# Patient Record
Sex: MALE | Race: WHITE | NOT HISPANIC OR LATINO | Employment: FULL TIME | RURAL
[De-identification: names, ages, dates, MRNs, and addresses within clinical notes are randomized per-mention and may not be internally consistent; named-entity substitution may affect disease eponyms.]

---
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
## 2022-05-20 ENCOUNTER — APPOINTMENT (OUTPATIENT)
Dept: CT IMAGING | Facility: HOSPITAL | Age: 22
End: 2022-05-20

## 2022-05-20 ENCOUNTER — HOSPITAL ENCOUNTER (EMERGENCY)
Facility: HOSPITAL | Age: 22
Discharge: HOME | End: 2022-05-20
Attending: EMERGENCY MEDICINE | Admitting: EMERGENCY MEDICINE

## 2022-05-20 VITALS
SYSTOLIC BLOOD PRESSURE: 111 MMHG | OXYGEN SATURATION: 100 % | TEMPERATURE: 100.1 F | HEART RATE: 104 BPM | HEIGHT: 69 IN | RESPIRATION RATE: 20 BRPM | DIASTOLIC BLOOD PRESSURE: 66 MMHG | BODY MASS INDEX: 23.13 KG/M2 | WEIGHT: 156.2 LBS

## 2022-05-20 DIAGNOSIS — N39.0 URINARY TRACT INFECTION WITHOUT HEMATURIA, SITE UNSPECIFIED: Primary | ICD-10-CM

## 2022-05-20 DIAGNOSIS — R50.9 ACUTE FEBRILE ILLNESS: ICD-10-CM

## 2022-05-20 LAB
ALBUMIN SERPL-MCNC: 4.6 G/DL (ref 3.5–5.2)
ALBUMIN/GLOB SERPL: 1.5 G/DL
ALP SERPL-CCNC: 74 U/L (ref 39–117)
ALT SERPL W P-5'-P-CCNC: 13 U/L (ref 1–41)
ANION GAP SERPL CALCULATED.3IONS-SCNC: 12.9 MMOL/L (ref 5–15)
AST SERPL-CCNC: 15 U/L (ref 1–40)
B PARAPERT DNA SPEC QL NAA+PROBE: NOT DETECTED
B PERT DNA SPEC QL NAA+PROBE: NOT DETECTED
BACTERIA UR QL AUTO: ABNORMAL /HPF
BASOPHILS # BLD AUTO: 0.04 10*3/MM3 (ref 0–0.2)
BASOPHILS NFR BLD AUTO: 0.2 % (ref 0–1.5)
BILIRUB SERPL-MCNC: 0.9 MG/DL (ref 0–1.2)
BILIRUB UR QL STRIP: NEGATIVE
BUN SERPL-MCNC: 14 MG/DL (ref 6–20)
BUN/CREAT SERPL: 13.7 (ref 7–25)
C PNEUM DNA NPH QL NAA+NON-PROBE: NOT DETECTED
CALCIUM SPEC-SCNC: 9.5 MG/DL (ref 8.6–10.5)
CHLORIDE SERPL-SCNC: 101 MMOL/L (ref 98–107)
CLARITY UR: ABNORMAL
CO2 SERPL-SCNC: 22.1 MMOL/L (ref 22–29)
COLOR UR: ABNORMAL
CREAT SERPL-MCNC: 1.02 MG/DL (ref 0.76–1.27)
D-LACTATE SERPL-SCNC: 1.4 MMOL/L (ref 0.5–2)
DEPRECATED RDW RBC AUTO: 38.6 FL (ref 37–54)
EGFRCR SERPLBLD CKD-EPI 2021: 108.6 ML/MIN/1.73
EOSINOPHIL # BLD AUTO: 0.03 10*3/MM3 (ref 0–0.4)
EOSINOPHIL NFR BLD AUTO: 0.2 % (ref 0.3–6.2)
ERYTHROCYTE [DISTWIDTH] IN BLOOD BY AUTOMATED COUNT: 12.1 % (ref 12.3–15.4)
FLUAV SUBTYP SPEC NAA+PROBE: NOT DETECTED
FLUBV RNA ISLT QL NAA+PROBE: NOT DETECTED
GLOBULIN UR ELPH-MCNC: 3 GM/DL
GLUCOSE SERPL-MCNC: 138 MG/DL (ref 65–99)
GLUCOSE UR STRIP-MCNC: NEGATIVE MG/DL
HADV DNA SPEC NAA+PROBE: NOT DETECTED
HCOV 229E RNA SPEC QL NAA+PROBE: NOT DETECTED
HCOV HKU1 RNA SPEC QL NAA+PROBE: NOT DETECTED
HCOV NL63 RNA SPEC QL NAA+PROBE: NOT DETECTED
HCOV OC43 RNA SPEC QL NAA+PROBE: NOT DETECTED
HCT VFR BLD AUTO: 41.2 % (ref 37.5–51)
HGB BLD-MCNC: 14.5 G/DL (ref 13–17.7)
HGB UR QL STRIP.AUTO: ABNORMAL
HMPV RNA NPH QL NAA+NON-PROBE: NOT DETECTED
HPIV1 RNA ISLT QL NAA+PROBE: NOT DETECTED
HPIV2 RNA SPEC QL NAA+PROBE: NOT DETECTED
HPIV3 RNA NPH QL NAA+PROBE: NOT DETECTED
HPIV4 P GENE NPH QL NAA+PROBE: NOT DETECTED
HYALINE CASTS UR QL AUTO: ABNORMAL /LPF
IMM GRANULOCYTES # BLD AUTO: 0.09 10*3/MM3 (ref 0–0.05)
IMM GRANULOCYTES NFR BLD AUTO: 0.5 % (ref 0–0.5)
KETONES UR QL STRIP: ABNORMAL
LEUKOCYTE ESTERASE UR QL STRIP.AUTO: ABNORMAL
LYMPHOCYTES # BLD AUTO: 2.15 10*3/MM3 (ref 0.7–3.1)
LYMPHOCYTES NFR BLD AUTO: 11.7 % (ref 19.6–45.3)
M PNEUMO IGG SER IA-ACNC: NOT DETECTED
MCH RBC QN AUTO: 30.9 PG (ref 26.6–33)
MCHC RBC AUTO-ENTMCNC: 35.2 G/DL (ref 31.5–35.7)
MCV RBC AUTO: 87.7 FL (ref 79–97)
MONOCYTES # BLD AUTO: 0.89 10*3/MM3 (ref 0.1–0.9)
MONOCYTES NFR BLD AUTO: 4.8 % (ref 5–12)
NEUTROPHILS NFR BLD AUTO: 15.25 10*3/MM3 (ref 1.7–7)
NEUTROPHILS NFR BLD AUTO: 82.6 % (ref 42.7–76)
NITRITE UR QL STRIP: NEGATIVE
NRBC BLD AUTO-RTO: 0 /100 WBC (ref 0–0.2)
PH UR STRIP.AUTO: 6 [PH] (ref 5–8)
PLATELET # BLD AUTO: 214 10*3/MM3 (ref 140–450)
PMV BLD AUTO: 9.5 FL (ref 6–12)
POTASSIUM SERPL-SCNC: 3.5 MMOL/L (ref 3.5–5.2)
PROCALCITONIN SERPL-MCNC: 0.19 NG/ML (ref 0–0.25)
PROT SERPL-MCNC: 7.6 G/DL (ref 6–8.5)
PROT UR QL STRIP: ABNORMAL
RBC # BLD AUTO: 4.7 10*6/MM3 (ref 4.14–5.8)
RBC # UR STRIP: ABNORMAL /HPF
REF LAB TEST METHOD: ABNORMAL
RHINOVIRUS RNA SPEC NAA+PROBE: NOT DETECTED
RSV RNA NPH QL NAA+NON-PROBE: NOT DETECTED
SARS-COV-2 RNA NPH QL NAA+NON-PROBE: NOT DETECTED
SODIUM SERPL-SCNC: 136 MMOL/L (ref 136–145)
SP GR UR STRIP: 1.03 (ref 1–1.03)
SQUAMOUS #/AREA URNS HPF: ABNORMAL /HPF
UROBILINOGEN UR QL STRIP: ABNORMAL
WBC # UR STRIP: ABNORMAL /HPF
WBC NRBC COR # BLD: 18.45 10*3/MM3 (ref 3.4–10.8)

## 2022-05-20 PROCEDURE — 87186 SC STD MICRODIL/AGAR DIL: CPT | Performed by: EMERGENCY MEDICINE

## 2022-05-20 PROCEDURE — 0202U NFCT DS 22 TRGT SARS-COV-2: CPT | Performed by: EMERGENCY MEDICINE

## 2022-05-20 PROCEDURE — 81001 URINALYSIS AUTO W/SCOPE: CPT | Performed by: EMERGENCY MEDICINE

## 2022-05-20 PROCEDURE — 84145 PROCALCITONIN (PCT): CPT | Performed by: EMERGENCY MEDICINE

## 2022-05-20 PROCEDURE — 80053 COMPREHEN METABOLIC PANEL: CPT | Performed by: EMERGENCY MEDICINE

## 2022-05-20 PROCEDURE — 74176 CT ABD & PELVIS W/O CONTRAST: CPT

## 2022-05-20 PROCEDURE — 85025 COMPLETE CBC W/AUTO DIFF WBC: CPT | Performed by: EMERGENCY MEDICINE

## 2022-05-20 PROCEDURE — 25010000002 CEFTRIAXONE SODIUM-DEXTROSE 1-3.74 GM-%(50ML) RECONSTITUTED SOLUTION: Performed by: EMERGENCY MEDICINE

## 2022-05-20 PROCEDURE — 83605 ASSAY OF LACTIC ACID: CPT | Performed by: EMERGENCY MEDICINE

## 2022-05-20 PROCEDURE — 87086 URINE CULTURE/COLONY COUNT: CPT | Performed by: EMERGENCY MEDICINE

## 2022-05-20 PROCEDURE — 96365 THER/PROPH/DIAG IV INF INIT: CPT

## 2022-05-20 PROCEDURE — 87077 CULTURE AEROBIC IDENTIFY: CPT | Performed by: EMERGENCY MEDICINE

## 2022-05-20 PROCEDURE — 99283 EMERGENCY DEPT VISIT LOW MDM: CPT

## 2022-05-20 RX ORDER — ACETAMINOPHEN 325 MG/1
975 TABLET ORAL ONCE
Status: COMPLETED | OUTPATIENT
Start: 2022-05-20 | End: 2022-05-20

## 2022-05-20 RX ORDER — CEFTRIAXONE 1 G/50ML
1 INJECTION, SOLUTION INTRAVENOUS ONCE
Status: COMPLETED | OUTPATIENT
Start: 2022-05-20 | End: 2022-05-20

## 2022-05-20 RX ORDER — IBUPROFEN 800 MG/1
800 TABLET ORAL ONCE
Status: COMPLETED | OUTPATIENT
Start: 2022-05-20 | End: 2022-05-20

## 2022-05-20 RX ORDER — CEFUROXIME AXETIL 500 MG/1
500 TABLET ORAL 2 TIMES DAILY
Qty: 20 TABLET | Refills: 0 | Status: SHIPPED | OUTPATIENT
Start: 2022-05-20 | End: 2022-12-15

## 2022-05-20 RX ADMIN — ACETAMINOPHEN 975 MG: 325 TABLET ORAL at 20:25

## 2022-05-20 RX ADMIN — CEFTRIAXONE 1 G: 1 INJECTION, SOLUTION INTRAVENOUS at 21:28

## 2022-05-20 RX ADMIN — SODIUM CHLORIDE 1000 ML: 9 INJECTION, SOLUTION INTRAVENOUS at 21:29

## 2022-05-20 RX ADMIN — IBUPROFEN 800 MG: 800 TABLET ORAL at 20:25

## 2022-05-21 NOTE — ED PROVIDER NOTES
TRIAGE CHIEF COMPLAINT:     Nursing and triage notes reviewed    Chief Complaint   Patient presents with   • Chills   • Fever   • Fatigue      HPI: Yoandy Merida is a 21 y.o. male who presents to the emergency department complaining of a 1 day history of fever, chills, fatigue, body aches, general malaise.  Patient denies having sore throat, nasal congestion, cough.  He states he has had some dysuria.  Denies abdominal pain, nausea, vomiting.  Denies recent travel or sick contacts.    REVIEW OF SYSTEMS: All other systems reviewed and are negative     PAST MEDICAL HISTORY:   History reviewed. No pertinent past medical history.     FAMILY HISTORY:   History reviewed. No pertinent family history.     SOCIAL HISTORY:   Social History     Socioeconomic History   • Marital status: Single        SURGICAL HISTORY:   History reviewed. No pertinent surgical history.     CURRENT MEDICATIONS:      Medication List      ASK your doctor about these medications    albuterol sulfate  (90 Base) MCG/ACT inhaler  Commonly known as: PROVENTIL HFA;VENTOLIN HFA;PROAIR HFA     cetirizine 10 MG tablet  Commonly known as: zyrTEC     mometasone-formoterol 200-5 MCG/ACT inhaler  Commonly known as: DULERA 200     montelukast 10 MG tablet  Commonly known as: SINGULAIR             ALLERGIES: Penicillins     PHYSICAL EXAM:   VITAL SIGNS:   Vitals:    05/20/22 1933   BP: 107/62   Pulse: 111   Resp: 18   Temp: (!) 101.6 °F (38.7 °C)   SpO2: 98%      CONSTITUTIONAL: Awake, oriented, appears nontoxic   HENT: Atraumatic, normocephalic, oral mucosa pink and moist, airway patent. Nares patent without drainage. External ears normal.   EYES: Conjunctivae clear   NECK: Trachea midline   CARDIOVASCULAR: Tachycardic with a regular rhythm, No murmurs, rubs, gallops   PULMONARY/CHEST: Clear to auscultation, no rhonchi, wheezes, or rales. Symmetrical breath sounds.  ABDOMINAL: Nondistended, soft, nontender - no rebound or guarding.  NEUROLOGIC: Nonfocal,  moving all four extremities, no gross sensory or motor deficits.   EXTREMITIES: No clubbing, cyanosis, or edema   SKIN: Warm, Dry, No erythema, No rash     ED COURSE / MEDICAL DECISION MAKING:   Yoandy Merida is a 21 y.o. male who presents to the emergency department for evaluation of flulike symptoms.  Patient nondistressed on arrival in the emergency department.  Vitals do reveal temperature of 101.6 on arrival in the emergency department.  Vitals otherwise are stable.  Will obtain testing for further evaluation of his presentation.    A respiratory panel was negative.  Urinalysis did indicate significant urinary infection.  There was some blood in his urine, given this basic labs as well as a CT scan was obtained of the abdomen and pelvis to rule out a renal stone which would complicate this infection.    Laboratory testing revealed normal lactic acid and procalcitonin.  It did however reveal an elevated white blood cell count and 18 which is consistent with infection.  Renal function was within the normal range.  CT scan as interpreted by the radiologist reveals no evidence of renal or ureteral stones.    Will treat for urinary infection with return precautions.    DECISION TO DISCHARGE/ADMIT: see ED care timeline     FINAL IMPRESSION:   1 --urinary tract infection  2 --acute febrile illness  3 --     Electronically signed by: Orquidea Greene MD, 5/20/2022 20:15 Orquidea Ruiz MD  05/20/22 0078

## 2022-05-22 LAB — BACTERIA SPEC AEROBE CULT: ABNORMAL

## 2022-08-04 ENCOUNTER — HOSPITAL ENCOUNTER (EMERGENCY)
Facility: HOSPITAL | Age: 22
Discharge: HOME | End: 2022-08-04
Attending: EMERGENCY MEDICINE | Admitting: EMERGENCY MEDICINE

## 2022-08-04 VITALS
HEART RATE: 91 BPM | BODY MASS INDEX: 21.98 KG/M2 | TEMPERATURE: 97.8 F | SYSTOLIC BLOOD PRESSURE: 129 MMHG | DIASTOLIC BLOOD PRESSURE: 66 MMHG | OXYGEN SATURATION: 99 % | RESPIRATION RATE: 18 BRPM | HEIGHT: 68 IN | WEIGHT: 145 LBS

## 2022-08-04 DIAGNOSIS — R31.9 URINARY TRACT INFECTION WITH HEMATURIA, SITE UNSPECIFIED: Primary | ICD-10-CM

## 2022-08-04 DIAGNOSIS — N39.0 URINARY TRACT INFECTION WITH HEMATURIA, SITE UNSPECIFIED: Primary | ICD-10-CM

## 2022-08-04 LAB
BACTERIA UR QL AUTO: ABNORMAL /HPF
BILIRUB UR QL STRIP: NEGATIVE
CLARITY UR: ABNORMAL
COLOR UR: ABNORMAL
GLUCOSE UR STRIP-MCNC: NEGATIVE MG/DL
HGB UR QL STRIP.AUTO: ABNORMAL
HYALINE CASTS UR QL AUTO: ABNORMAL /LPF
KETONES UR QL STRIP: NEGATIVE
LEUKOCYTE ESTERASE UR QL STRIP.AUTO: ABNORMAL
NITRITE UR QL STRIP: NEGATIVE
PH UR STRIP.AUTO: 6 [PH] (ref 5–8)
PROT UR QL STRIP: ABNORMAL
RBC # UR STRIP: ABNORMAL /HPF
REF LAB TEST METHOD: ABNORMAL
SP GR UR STRIP: 1.02 (ref 1–1.03)
SQUAMOUS #/AREA URNS HPF: ABNORMAL /HPF
UROBILINOGEN UR QL STRIP: ABNORMAL
WBC # UR STRIP: ABNORMAL /HPF

## 2022-08-04 PROCEDURE — 81001 URINALYSIS AUTO W/SCOPE: CPT | Performed by: EMERGENCY MEDICINE

## 2022-08-04 PROCEDURE — 99283 EMERGENCY DEPT VISIT LOW MDM: CPT

## 2022-08-04 RX ORDER — CEPHALEXIN 500 MG/1
500 CAPSULE ORAL 2 TIMES DAILY
Qty: 14 CAPSULE | Refills: 0 | Status: SHIPPED | OUTPATIENT
Start: 2022-08-04 | End: 2022-12-15

## 2022-08-04 NOTE — ED PROVIDER NOTES
TRIAGE CHIEF COMPLAINT:     Nursing and triage notes reviewed    Chief Complaint   Patient presents with   • Blood in Urine      HPI: Yoandy Merida is a 22 y.o. male who presents to the emergency department complaining of hematuria.  Patient states he went to the bathroom this morning and had a small amount of blood with possible small clot.  Patient states he has had some urinary frequency and pressure with urination over the past several days before this.  Denies any flank pain.  No fevers or chills.  No penile discharge.  No testicular pain.  No nausea or vomiting.    REVIEW OF SYSTEMS: All other systems reviewed and are negative     PAST MEDICAL HISTORY:   History reviewed. No pertinent past medical history.     FAMILY HISTORY:   History reviewed. No pertinent family history.     SOCIAL HISTORY:   Social History     Socioeconomic History   • Marital status: Single   Tobacco Use   • Smoking status: Never Smoker   • Smokeless tobacco: Never Used   Vaping Use   • Vaping Use: Never used   Substance and Sexual Activity   • Alcohol use: Not Currently   • Drug use: Never   • Sexual activity: Defer        SURGICAL HISTORY:   Past Surgical History:   Procedure Laterality Date   • ORCHIOPEXY          CURRENT MEDICATIONS:      Medication List      ASK your doctor about these medications    albuterol sulfate  (90 Base) MCG/ACT inhaler  Commonly known as: PROVENTIL HFA;VENTOLIN HFA;PROAIR HFA     cefuroxime 500 MG tablet  Commonly known as: CEFTIN  Take 1 tablet by mouth 2 (Two) Times a Day.     cetirizine 10 MG tablet  Commonly known as: zyrTEC     mometasone-formoterol 200-5 MCG/ACT inhaler  Commonly known as: DULERA 200     montelukast 10 MG tablet  Commonly known as: SINGULAIR             ALLERGIES: Penicillins and Amoxicillin     PHYSICAL EXAM:   VITAL SIGNS:   Vitals:    08/04/22 0759   BP: 129/66   Pulse: 91   Resp: 18   Temp: 97.8 °F (36.6 °C)   SpO2: 99%      CONSTITUTIONAL: Awake, oriented, appears nontoxic    HENT: Atraumatic, normocephalic, oral mucosa pink and moist, airway patent. Nares patent without drainage. External ears normal.   EYES: Conjunctivae clear   NECK: Trachea midline   CARDIOVASCULAR: Normal heart rate, Normal rhythm, No murmurs, rubs, gallops   PULMONARY/CHEST: Clear to auscultation, no rhonchi, wheezes, or rales. Symmetrical breath sounds.  ABDOMINAL: Nondistended, soft, nontender - no rebound or guarding   NEUROLOGIC: Nonfocal, moving all four extremities, no gross sensory or motor deficits.   EXTREMITIES: No clubbing, cyanosis, or edema   SKIN: Warm, Dry, No erythema, No rash     ED COURSE / MEDICAL DECISION MAKING:   Yoandy Merida is a 22 y.o. male who presents to the emergency department for evaluation of hematuria and urinary frequency.  Patient nondistressed on arrival in the emergency department.  Vital signs are stable.  Physical exam is largely unremarkable.  Will obtain urinalysis for further evaluation.    Urinalysis does reveal large amount of blood but also reveals large amount of leukocytes.  I suspect patient is likely suffering from infection.  We will treat him with antibiotics with outpatient follow-up for repeat urinalysis to ensure resolution.  Return precautions were discussed.    DECISION TO DISCHARGE/ADMIT: see ED care timeline     FINAL IMPRESSION:   1 --urinary tract infection  2 --hematuria  3 --     Electronically signed by: Orquidea Greene MD, 8/4/2022 08:35 Orquidea Ruiz MD  08/04/22 0915

## 2022-12-14 PROBLEM — J45.909 ALLERGIC ASTHMA: Status: ACTIVE | Noted: 2022-12-14

## 2022-12-14 PROBLEM — H10.10 ALLERGIC CONJUNCTIVITIS: Status: ACTIVE | Noted: 2022-12-14

## 2022-12-14 PROBLEM — J30.9 ALLERGIC RHINITIS: Status: ACTIVE | Noted: 2022-12-14

## 2022-12-15 ENCOUNTER — OFFICE VISIT (OUTPATIENT)
Dept: FAMILY MEDICINE CLINIC | Facility: CLINIC | Age: 22
End: 2022-12-15

## 2022-12-15 VITALS
WEIGHT: 149.8 LBS | DIASTOLIC BLOOD PRESSURE: 74 MMHG | HEIGHT: 68 IN | BODY MASS INDEX: 22.7 KG/M2 | OXYGEN SATURATION: 98 % | TEMPERATURE: 97.8 F | HEART RATE: 84 BPM | SYSTOLIC BLOOD PRESSURE: 116 MMHG

## 2022-12-15 DIAGNOSIS — F41.1 GENERALIZED ANXIETY DISORDER: Primary | ICD-10-CM

## 2022-12-15 DIAGNOSIS — F34.1 DYSTHYMIA: ICD-10-CM

## 2022-12-15 DIAGNOSIS — R73.9 HYPERGLYCEMIA: ICD-10-CM

## 2022-12-15 DIAGNOSIS — Z11.3 SCREENING FOR STD (SEXUALLY TRANSMITTED DISEASE): ICD-10-CM

## 2022-12-15 DIAGNOSIS — R31.29 MICROSCOPIC HEMATURIA: ICD-10-CM

## 2022-12-15 DIAGNOSIS — R11.0 NAUSEA: ICD-10-CM

## 2022-12-15 PROCEDURE — 99214 OFFICE O/P EST MOD 30 MIN: CPT | Performed by: INTERNAL MEDICINE

## 2022-12-15 RX ORDER — ESCITALOPRAM OXALATE 10 MG/1
10 TABLET ORAL DAILY
Qty: 30 TABLET | Refills: 1 | Status: SHIPPED | OUTPATIENT
Start: 2022-12-15 | End: 2023-01-30

## 2022-12-15 RX ORDER — COVID-19 ANTIGEN TEST
KIT MISCELLANEOUS
COMMUNITY
Start: 2022-10-19

## 2022-12-15 RX ORDER — FAMOTIDINE 20 MG/1
20 TABLET, FILM COATED ORAL DAILY
COMMUNITY
Start: 2022-11-08

## 2022-12-15 RX ORDER — HYDROXYZINE HYDROCHLORIDE 25 MG/1
TABLET, FILM COATED ORAL
Qty: 45 TABLET | Refills: 1 | Status: SHIPPED | OUTPATIENT
Start: 2022-12-15

## 2022-12-15 NOTE — PROGRESS NOTES
Venipuncture Blood Specimen Collection  Venipuncture performed in left arm by Luzma Goode MA with good hemostasis. Patient tolerated the procedure well without complications.   12/15/22   Luzma Goode MA

## 2022-12-15 NOTE — PROGRESS NOTES
Follow Up Office Visit      Date: 12/15/2022   Patient Name: Yoandy Merida  : 2000   MRN: [id number]     Chief Complaint:    Chief Complaint   Patient presents with   • Anxiety   • Nausea       History of Present Illness: Yoandy Merida is a 22 y.o. male who is here today for complaints of longstanding anxiousness ever since middle school when he would get very anxious and try to void school when he had a test, progressively having more frequent symptoms especially last 2 months where he feels anxious on a daily basis intermittently, overthink things, noticing last week that he will secondarily get nauseated and vomited to spontaneously without any preceding pain, no diarrhea, then feel perfectly fine afterwards.  The vomiting again seems to be triggered by his moods.  He also has dysthymia at times though not a major problem.  At times he also has some trouble with frequent wakening but will then fall asleep again.  Does not appear to really have any true manic type activities and no real anger, just some irritability at times.  He is really not aware of any specific acute stressors though he does feel like he has a limited social life, has some financial stressors from college expenses in school, but nothing acutely new.  Does also have a history of UTI most recently treated in 2022, and previous to that in 2020, having some basic labs including CBC CMP viral respiratory panel lactic acid and procalcitonin were otherwise unremarkable.  Last STD screen about 2 years ago.  Is sexually active.    Subjective      Review of Systems:   Review of Systems    I have reviewed the patients family history, social history, past medical history, past surgical history and have updated it as appropriate.     Medications:     Current Outpatient Medications:   •  cetirizine (zyrTEC) 10 MG tablet, Take 10 mg by mouth Daily., Disp: , Rfl:   •  famotidine (PEPCID) 20 MG tablet, Take 20 mg by mouth Daily., Disp: , Rfl:   •   "Flowflex COVID-19 Ag Home Test kit, , Disp: , Rfl:   •  montelukast (SINGULAIR) 10 MG tablet, Take 10 mg by mouth Every Night., Disp: , Rfl:   •  escitalopram (Lexapro) 10 MG tablet, Take 1 tablet by mouth Daily., Disp: 30 tablet, Rfl: 1  •  hydrOXYzine (ATARAX) 25 MG tablet, 1/2 to 1 tablet 3 times daily as needed for anxiety or insomnia, Disp: 45 tablet, Rfl: 1    Allergies:   Allergies   Allergen Reactions   • Penicillins Hives   • Amoxil [Amoxicillin] Rash       Objective     Physical Exam: Please see above  Vital Signs:   Vitals:    12/15/22 1410   BP: 116/74   BP Location: Left arm   Patient Position: Sitting   Cuff Size: Adult   Pulse: 84   Temp: 97.8 °F (36.6 °C)   TempSrc: Temporal   SpO2: 98%   Weight: 67.9 kg (149 lb 12.8 oz)   Height: 172.7 cm (68\")     Body mass index is 22.78 kg/m².  BMI is within normal parameters. No other follow-up for BMI required.       Physical Exam  General: Slender slightly anxious appearing 22-year-old  male.  Affect appropriate.  Neck supple with no masses or tenderness  Lungs are clear with no wheeze tachypnea or cough  Cardiac regular rate rhythm with no murmurs gallops or rubs  Abdomen soft and nontender with no organomegaly or masses and normal bowel sounds  Procedures    Results:   Labs:   No results found for: HGBA1C, CMP, CBCDIFFPANEL, CREAT, TSH     POCT Results (if applicable):   Results for orders placed or performed during the hospital encounter of 08/04/22   Urinalysis With Culture If Indicated - Urine, Clean Catch    Specimen: Urine, Clean Catch   Result Value Ref Range    Color, UA Dark Yellow (A) Yellow, Straw    Appearance, UA Turbid (A) Clear    pH, UA 6.0 5.0 - 8.0    Specific Gravity, UA 1.025 1.005 - 1.030    Glucose, UA Negative Negative    Ketones, UA Negative Negative    Bilirubin, UA Negative Negative    Blood, UA Large (3+) (A) Negative    Protein, UA >=300 mg/dL (3+) (A) Negative    Leuk Esterase, UA Large (3+) (A) Negative    Nitrite, UA " Negative Negative    Urobilinogen, UA 0.2 E.U./dL 0.2 - 1.0 E.U./dL   Urinalysis, Microscopic Only - Urine, Clean Catch    Specimen: Urine, Clean Catch   Result Value Ref Range    RBC, UA Too Numerous to Count (A) None Seen /HPF    WBC, UA 3-5 (A) None Seen /HPF    Bacteria, UA Trace (A) None Seen /HPF    Squamous Epithelial Cells, UA None Seen None Seen, 0-2 /HPF    Hyaline Casts, UA None Seen None Seen /LPF    Methodology Manual Light Microscopy        Review laboratory testing from outside office from 5/20/2022:  Procalcitonin 0.19, normal  CMP with random glucose 138 otherwise normal  Lactic acid 1.4, normal   White count 18.45, left shift, H&H 14.5 and 41.2, platelets 214,000  Viral respiratory profile negative  UA with pyuria and microscopic hematuria, culture greater than 100,000 colonies of Citrobacter koseri    Assessment / Plan      Assessment/Plan:   Diagnoses and all orders for this visit:    1. Generalized anxiety disorder (Primary)  -     escitalopram (Lexapro) 10 MG tablet; Take 1 tablet by mouth Daily.  Dispense: 30 tablet; Refill: 1  -     hydrOXYzine (ATARAX) 25 MG tablet; 1/2 to 1 tablet 3 times daily as needed for anxiety or insomnia  Dispense: 45 tablet; Refill: 1  -     TSH Rfx On Abnormal To Free T4; Future  -     TSH Rfx On Abnormal To Free T4  Longstanding problem with anxiety acutely worse in the last 2 months.  Discussed options of treatment, patient agreeable to trial of Lexapro 10 mg daily with delayed onset and benefits and early potential onset and side effects discussed.  Treat acute symptoms as needed with hydroxyzine.  Reassess clinically in 1 month.  We will check a TSH to rule this out as a trigger of his symptoms  2. Dysthymia  -     escitalopram (Lexapro) 10 MG tablet; Take 1 tablet by mouth Daily.  Dispense: 30 tablet; Refill: 1  See above.  3. Nausea  Nausea appears to be very situational with his anxiety with no suspicion for underlying primary GI disorder.  Patient declines  any need for antiemetic at this time  4. Screening for STD (sexually transmitted disease)  -     HIV-1 / O / 2 Ag / Antibody 4th Generation; Future  -     Chlamydia trachomatis, Neisseria gonorrhoeae, PCR - Urine, Urine, Random Void; Future  -     VIRAL HEPATITIS HBV, HCV; Future  -     VIRAL HEPATITIS HBV, HCV  -     HIV-1 / O / 2 Ag / Antibody 4th Generation  -     RPR; Future  -     VIRAL HEPATITIS HBV, HCV; Future  -     Chlamydia trachomatis, Neisseria gonorrhoeae, PCR - Urine, Urine, Random Void  History of prior UTI x2 most recently in 5/2022.  We will check STD screening as noted.  5. Microscopic hematuria  -     Urinalysis With Culture If Indicated -; Future  -     Urinalysis With Culture If Indicated - Urine, Random Void  Previous UTI with associated prescribed hematuria x2 most recently 5/2022.  Repeat urinalysis.  6. Hyperglycemia  -     Hemoglobin A1c; Future  Patient did have mild hyperglycemia on a nonfasting chemistry profile from 5/2022.  Check hemoglobin A1c as noted        Follow Up:   Return in about 1 month (around 1/15/2023) for Annual physical.      At [facility], we believe that sharing information builds trust and better relationships. You are receiving this note because you recently visited [facility]. It is possible you will see health information before a provider has talked with you about it. This kind of information can be easy to misunderstand. To help you fully understand what it means for your health, we urge you to discuss this note with your provider.    Luke Graham MD  [facility]   Answers for HPI/ROS submitted by the patient on 12/15/2022  What is the primary reason for your visit?: Other  Please describe your symptoms.: nauseous, dizziness, possible anxiety  Have you had these symptoms before?: Yes  How long have you been having these symptoms?: Greater than 2 weeks  Please list any medications you are currently taking for this condition.: none  Please describe  any probable cause for these symptoms. : recent anxiety attack(s), constant overthinking and feeling of loneliness.

## 2022-12-16 LAB
HBA1C MFR BLD: 5 % (ref 4.8–5.6)
HBV CORE AB SERPL QL IA: NEGATIVE
HBV SURFACE AB SER QL: NON REACTIVE
HBV SURFACE AG SERPL QL IA: NEGATIVE
HCV AB S/CO SERPL IA: <0.1 S/CO RATIO (ref 0–0.9)
HCV AB SERPL QL IA: NORMAL
HIV 1+2 AB+HIV1 P24 AG SERPL QL IA: NON REACTIVE
RPR SER QL: NON REACTIVE
TSH SERPL DL<=0.005 MIU/L-ACNC: 1.73 UIU/ML (ref 0.45–4.5)

## 2022-12-17 LAB
APPEARANCE UR: CLEAR
BACTERIA #/AREA URNS HPF: NORMAL /[HPF]
BILIRUB UR QL STRIP: NEGATIVE
C TRACH RRNA SPEC QL NAA+PROBE: NEGATIVE
CASTS URNS QL MICRO: NORMAL /LPF
COLOR UR: YELLOW
EPI CELLS #/AREA URNS HPF: NORMAL /HPF (ref 0–10)
GLUCOSE UR QL STRIP: NEGATIVE
HGB UR QL STRIP: NEGATIVE
KETONES UR QL STRIP: NEGATIVE
LEUKOCYTE ESTERASE UR QL STRIP: NEGATIVE
MICRO URNS: NORMAL
MICRO URNS: NORMAL
N GONORRHOEA RRNA SPEC QL NAA+PROBE: NEGATIVE
NITRITE UR QL STRIP: NEGATIVE
PH UR STRIP: 7.5 [PH] (ref 5–7.5)
PROT UR QL STRIP: NEGATIVE
RBC #/AREA URNS HPF: NORMAL /HPF (ref 0–2)
SP GR UR STRIP: 1.01 (ref 1–1.03)
URINALYSIS REFLEX: NORMAL
UROBILINOGEN UR STRIP-MCNC: 1 MG/DL (ref 0.2–1)
WBC #/AREA URNS HPF: NORMAL /HPF (ref 0–5)

## 2022-12-20 ENCOUNTER — TELEPHONE (OUTPATIENT)
Dept: FAMILY MEDICINE CLINIC | Facility: CLINIC | Age: 22
End: 2022-12-20

## 2022-12-20 NOTE — TELEPHONE ENCOUNTER
----- Message from Luke Graham MD sent at 12/19/2022  6:06 PM EST -----  Please advise patient that all of his completed lab testing was very satisfactory, including STD testing with negative chlamydia, negative gonorrhea, negative HIV, negative syphilis testing, hepatitis B and C both negative, urinalysis screening normal, diabetic screening normal with hemoglobin A1c of 5.0%, and normal thyroid testing.

## 2022-12-20 NOTE — TELEPHONE ENCOUNTER
I have left him a voice regarding his lab results. I have asked that he call me if he has any questions. TF

## 2023-01-30 DIAGNOSIS — F34.1 DYSTHYMIA: ICD-10-CM

## 2023-01-30 DIAGNOSIS — F41.1 GENERALIZED ANXIETY DISORDER: ICD-10-CM

## 2023-01-30 RX ORDER — ESCITALOPRAM OXALATE 10 MG/1
10 TABLET ORAL DAILY
Qty: 30 TABLET | Refills: 0 | Status: SHIPPED | OUTPATIENT
Start: 2023-01-30

## 2023-04-11 DIAGNOSIS — F34.1 DYSTHYMIA: ICD-10-CM

## 2023-04-11 DIAGNOSIS — F41.1 GENERALIZED ANXIETY DISORDER: ICD-10-CM

## 2023-04-11 RX ORDER — ESCITALOPRAM OXALATE 10 MG/1
10 TABLET ORAL DAILY
Qty: 30 TABLET | Refills: 0 | Status: SHIPPED | OUTPATIENT
Start: 2023-04-11 | End: 2023-04-17 | Stop reason: SDUPTHER

## 2023-04-17 ENCOUNTER — OFFICE VISIT (OUTPATIENT)
Dept: FAMILY MEDICINE CLINIC | Facility: CLINIC | Age: 23
End: 2023-04-17
Payer: COMMERCIAL

## 2023-04-17 VITALS
WEIGHT: 152 LBS | HEART RATE: 75 BPM | HEIGHT: 68 IN | BODY MASS INDEX: 23.04 KG/M2 | TEMPERATURE: 97.3 F | DIASTOLIC BLOOD PRESSURE: 76 MMHG | OXYGEN SATURATION: 99 % | SYSTOLIC BLOOD PRESSURE: 116 MMHG

## 2023-04-17 DIAGNOSIS — F41.1 GENERALIZED ANXIETY DISORDER: Primary | ICD-10-CM

## 2023-04-17 DIAGNOSIS — F34.1 DYSTHYMIA: ICD-10-CM

## 2023-04-17 DIAGNOSIS — J30.1 SEASONAL ALLERGIC RHINITIS DUE TO POLLEN: ICD-10-CM

## 2023-04-17 RX ORDER — MONTELUKAST SODIUM 10 MG/1
10 TABLET ORAL NIGHTLY
Qty: 30 TABLET | Refills: 6 | Status: SHIPPED | OUTPATIENT
Start: 2023-04-17

## 2023-04-17 RX ORDER — ESCITALOPRAM OXALATE 10 MG/1
10 TABLET ORAL DAILY
Qty: 30 TABLET | Refills: 6 | Status: SHIPPED | OUTPATIENT
Start: 2023-04-17

## 2023-04-17 NOTE — PROGRESS NOTES
Follow Up Office Visit      Date: 2023   Patient Name: Yoandy Merida  : 2000   MRN: [id number]     Chief Complaint:    Chief Complaint   Patient presents with   • Follow-up     Med refill       History of Present Illness: Yoandy Merida is a 22 y.o. male who is here today for follow-up of generalized anxiety disorder for which he was started 4 months ago on Lexapro 10 mg daily, failing to follow-up in 1 month as recommended, but noting the medication seems to be doing well controlling his anxiousness, not really having any dysthymia, feeling calmer, and also sleeping better having only delayed sleep initiation of 1 to 2 hours rather than multiple hours.  No side effect of the medication.  Also notes his allergies are well controlled taking Singulair 10 mg daily prophylaxis not currently requiring his Zyrtec.  He remotely had a history of asthma but this has not been a problem for years.  No other acute problems or concerns.  Did get some screening laboratory testing 2023 that was all satisfactory    Subjective      Review of Systems:   Review of Systems    I have reviewed the patients family history, social history, past medical history, past surgical history and have updated it as appropriate.     Medications:     Current Outpatient Medications:   •  cetirizine (zyrTEC) 10 MG tablet, Take 1 tablet by mouth Daily., Disp: , Rfl:   •  escitalopram (LEXAPRO) 10 MG tablet, Take 1 tablet by mouth Daily., Disp: 30 tablet, Rfl: 6  •  famotidine (PEPCID) 20 MG tablet, Take 1 tablet by mouth Daily., Disp: , Rfl:   •  Flowflex COVID-19 Ag Home Test kit, , Disp: , Rfl:   •  hydrOXYzine (ATARAX) 25 MG tablet, 1/2 to 1 tablet 3 times daily as needed for anxiety or insomnia, Disp: 45 tablet, Rfl: 1  •  montelukast (SINGULAIR) 10 MG tablet, Take 1 tablet by mouth Every Night. For prevention of allergies, Disp: 30 tablet, Rfl: 6  •  ondansetron (ZOFRAN) 4 MG tablet, , Disp: , Rfl:     Allergies:   Allergies   Allergen  "Reactions   • Penicillins Hives   • Amoxil [Amoxicillin] Rash       Objective     Physical Exam: Please see above  Vital Signs:   Vitals:    04/17/23 1403   BP: 116/76   BP Location: Left arm   Patient Position: Sitting   Cuff Size: Adult   Pulse: 75   Temp: 97.3 °F (36.3 °C)   TempSrc: Temporal   SpO2: 99%   Weight: 68.9 kg (152 lb)   Height: 172.7 cm (68\")     Body mass index is 23.11 kg/m².  BMI is within normal parameters. No other follow-up for BMI required.       Physical Exam  General: Healthy slender 22-year-old male in no acute distress.  Head and neck: TMs and canals bilaterally clear, conjunctive a clear, nares minimal congestion with no rhinorrhea, oral pharynx clear with moist membranes, neck supple with no masses or tenderness  Lungs clear with no wheeze tachypnea or cough  Cardiac regular rate rhythm with no murmurs gallops or rubs  Neurological exam grossly normal  Procedures    Results:   Labs:   Hemoglobin A1C   Date Value Ref Range Status   12/15/2022 5.0 4.8 - 5.6 % Final     Comment:              Prediabetes: 5.7 - 6.4           Diabetes: >6.4           Glycemic control for adults with diabetes: <7.0       TSH   Date Value Ref Range Status   12/15/2022 1.730 0.450 - 4.500 uIU/mL Final        POCT Results (if applicable):   Results for orders placed or performed in visit on 12/15/22   Chlamydia trachomatis, Neisseria gonorrhoeae, PCR - Urine, Urine, Random Void    Specimen: Urine, Random Void    Urine  Release to paul   Result Value Ref Range    Chlamydia trachomatis, [place] Negative Negative    Neisseria gonorrhoeae, [place] Negative Negative   HIV-1 / O / 2 Ag / Antibody 4th Generation    Specimen: Arm, Left; Blood    Blood  Release to paul   Result Value Ref Range    HIV Screen 4th Gen w/RFX (Reference) Non Reactive Non Reactive   Hemoglobin A1c    Specimen: Arm, Left; Blood    Blood  Release to paul   Result Value Ref Range    Hemoglobin A1C 5.0 4.8 - 5.6 %   TSH Rfx On Abnormal To Free T4    " Specimen: Arm, Left; Blood    Blood  Release to paul   Result Value Ref Range    TSH 1.730 0.450 - 4.500 uIU/mL   RPR    Specimen: Arm, Left; Blood    Blood  Release to paul   Result Value Ref Range    RPR Non Reactive Non Reactive   VIRAL HEPATITIS HBV, HCV    Specimen: Arm, Left; Blood    Blood  Release to paul   Result Value Ref Range    Hepatitis B Surface Ag Negative Negative    Hep B S Ab Non Reactive     Hep B Core Total Ab Negative Negative    Hepatitis C Ab <0.1 0.0 - 0.9 s/co ratio   Urinalysis With Culture If Indicated - Urine, Random Void    Specimen: Urine, Random Void   Result Value Ref Range    Specific Gravity, UA 1.015 1.005 - 1.030    pH, UA 7.5 5.0 - 7.5    Color, UA Yellow Yellow    Appearance, UA Clear Clear    Leukocytes, UA Negative Negative    Protein Negative Negative/Trace    Glucose, UA Negative Negative    Ketones Negative Negative    Blood, UA Negative Negative    Bilirubin, UA Negative Negative    Urobilinogen, UA 1.0 0.2 - 1.0 mg/dL    Nitrite, UA Negative Negative    Microscopic Examination Comment     Microscopic Examination See below:     Urinalysis Reflex Comment    Microscopic Examination -   Result Value Ref Range    WBC, UA 0-5 0 - 5 /hpf    RBC, UA None seen 0 - 2 /hpf    Epithelial Cells (non renal) 0-10 0 - 10 /hpf    Casts None seen None seen /lpf    Bacteria, UA None seen None seen/Few   Interpretation:    Blood  Release to paul   Result Value Ref Range    Interpretation Comment        Imaging:   No valid procedures specified.     Patient health questionnaire-9 screening for depression score of 7 consistent with minimal dysthymia  ERLIN-7 screen for anxiety score 3, consistent with normal study  Mood disorder questionnaire for bipolar disorder positive for 6 of 13 items, technically a negative screen    Assessment / Plan      Assessment/Plan:   Diagnoses and all orders for this visit:    1. Generalized anxiety disorder (Primary)  -     escitalopram (LEXAPRO) 10 MG tablet;  Take 1 tablet by mouth Daily.  Dispense: 30 tablet; Refill: 6  Patient reports doing well taking his Lexapro 10 mg daily for the last 4 months, with an improved quality of life and no significant side effects.  We will continue current regimen reassessing in 6 months.  2. Dysthymia  -     escitalopram (LEXAPRO) 10 MG tablet; Take 1 tablet by mouth Daily.  Dispense: 30 tablet; Refill: 6  Again patient reports doing well taking the Lexapro.  Continue same as noted above.  3. Seasonal allergic rhinitis due to pollen  -     montelukast (SINGULAIR) 10 MG tablet; Take 1 tablet by mouth Every Night. For prevention of allergies  Dispense: 30 tablet; Refill: 6  Doing well on montelukast 10 mg nightly prophylaxis, having availability of cetirizine 10 mg daily as needed which is not currently requiring.  Continue current regimen.      Follow Up:   Return in about 6 months (around 10/17/2023) for Annual physical.      At [facility], we believe that sharing information builds trust and better relationships. You are receiving this note because you recently visited [facility]. It is possible you will see health information before a provider has talked with you about it. This kind of information can be easy to misunderstand. To help you fully understand what it means for your health, we urge you to discuss this note with your provider.    Luke Graham MD  [facility]

## 2023-05-31 ENCOUNTER — OFFICE VISIT (OUTPATIENT)
Dept: FAMILY MEDICINE CLINIC | Facility: CLINIC | Age: 23
End: 2023-05-31

## 2023-05-31 VITALS — BODY MASS INDEX: 22.88 KG/M2 | HEIGHT: 68 IN | WEIGHT: 151 LBS | TEMPERATURE: 97.5 F

## 2023-05-31 DIAGNOSIS — R31.9 URINARY TRACT INFECTION WITH HEMATURIA, SITE UNSPECIFIED: Primary | ICD-10-CM

## 2023-05-31 DIAGNOSIS — N39.0 URINARY TRACT INFECTION WITH HEMATURIA, SITE UNSPECIFIED: Primary | ICD-10-CM

## 2023-05-31 DIAGNOSIS — N34.2 URETHRITIS: ICD-10-CM

## 2023-05-31 LAB
BILIRUB BLD-MCNC: NEGATIVE MG/DL
CLARITY, POC: ABNORMAL
COLOR UR: YELLOW
GLUCOSE UR STRIP-MCNC: NEGATIVE MG/DL
KETONES UR QL: NEGATIVE
LEUKOCYTE EST, POC: ABNORMAL
NITRITE UR-MCNC: NEGATIVE MG/ML
PH UR: 6 [PH] (ref 5–8)
PROT UR STRIP-MCNC: ABNORMAL MG/DL
RBC # UR STRIP: ABNORMAL /UL
SP GR UR: 1.02 (ref 1–1.03)
UROBILINOGEN UR QL: ABNORMAL

## 2023-05-31 RX ORDER — CEFDINIR 300 MG/1
300 CAPSULE ORAL 2 TIMES DAILY
Qty: 14 CAPSULE | Refills: 0 | Status: SHIPPED | OUTPATIENT
Start: 2023-05-31 | End: 2023-06-07

## 2023-05-31 RX ORDER — CEFIXIME 400 MG/1
400 CAPSULE ORAL DAILY
Qty: 7 CAPSULE | Refills: 7 | Status: SHIPPED | OUTPATIENT
Start: 2023-05-31 | End: 2023-05-31

## 2023-05-31 RX ORDER — DOXYCYCLINE HYCLATE 100 MG/1
100 CAPSULE ORAL 2 TIMES DAILY
Qty: 14 CAPSULE | Refills: 0 | Status: SHIPPED | OUTPATIENT
Start: 2023-05-31 | End: 2023-06-07

## 2023-05-31 NOTE — PROGRESS NOTES
Venipuncture Blood Specimen Collection  Venipuncture performed in left arm by Luzma Goode MA with good hemostasis. Patient tolerated the procedure well without complications.   05/31/23   Luzma Goode MA

## 2023-05-31 NOTE — PROGRESS NOTES
Follow Up Office Visit      Date: 2023   Patient Name: Yoandy Merida  : 2000   MRN: [id number]     Chief Complaint:    Chief Complaint   Patient presents with   • Urinary Tract Infection     Urine dipstick results in chart         History of Present Illness: Yoandy Merida is a 22 y.o. male who is here today for 2 days history of urinary frequency and urgency with some mild dysuria but no blood, associated with yesterday some vague fatigue and headache but no fevers or chills.  Also has had a very minimal discomfort in the right lower quadrant, no nausea or vomiting and no change in bowel habits.  No sexual activity since 2023, indicating he is homosexual but always uses condoms.  History amoxicillin allergy causing rash but no immediate hypersensitivity reaction.    Subjective      Review of Systems:   Review of Systems    I have reviewed the patients family history, social history, past medical history, past surgical history and have updated it as appropriate.     Medications:     Current Outpatient Medications:   •  cetirizine (zyrTEC) 10 MG tablet, Take 1 tablet by mouth Daily., Disp: , Rfl:   •  escitalopram (LEXAPRO) 10 MG tablet, Take 1 tablet by mouth Daily., Disp: 30 tablet, Rfl: 6  •  famotidine (PEPCID) 20 MG tablet, Take 1 tablet by mouth Daily., Disp: , Rfl:   •  hydrOXYzine (ATARAX) 25 MG tablet, 1/2 to 1 tablet 3 times daily as needed for anxiety or insomnia, Disp: 45 tablet, Rfl: 1  •  montelukast (SINGULAIR) 10 MG tablet, Take 1 tablet by mouth Every Night. For prevention of allergies, Disp: 30 tablet, Rfl: 6  •  ondansetron (ZOFRAN) 4 MG tablet, , Disp: , Rfl:   •  Cefixime (SUPRAX) 400 MG capsule, Take 1 capsule by mouth Daily., Disp: 7 capsule, Rfl: 7  •  doxycycline (VIBRAMYCIN) 100 MG capsule, Take 1 capsule by mouth 2 (Two) Times a Day for 7 days., Disp: 14 capsule, Rfl: 0  •  Flowflex COVID-19 Ag Home Test kit, , Disp: , Rfl:     Allergies:   Allergies   Allergen Reactions  "  • Penicillins Hives   • Amoxil [Amoxicillin] Rash       Objective     Physical Exam: Please see above  Vital Signs:   Vitals:    05/31/23 0943   Temp: 97.5 °F (36.4 °C)   TempSrc: Temporal   Weight: 68.5 kg (151 lb)   Height: 172.7 cm (68\")     Body mass index is 22.96 kg/m².  BMI is within normal parameters. No other follow-up for BMI required.       Physical Exam  General: Healthy 22-year-old male in no acute distress.  Lungs clear no wheeze tachypnea,  Cardiac regular rate rhythm with no murmurs gallops or rubs  Back with no CVA tenderness percussion  Abdomen soft throughout with very minimal discomfort localized in the right lower quadrant with no rebound or guard, no organomegaly or masses, normal bowel sounds   exam reveals a normal circumcised male with a smaller urethral orifice, no discharge, no penile or genital skin lesions, testes with no nodules or tenderness, no inguinal herniation or adenopathy  Neurological exam grossly normal  Procedures    Results:   Labs:   Hemoglobin A1C   Date Value Ref Range Status   12/15/2022 5.0 4.8 - 5.6 % Final     Comment:              Prediabetes: 5.7 - 6.4           Diabetes: >6.4           Glycemic control for adults with diabetes: <7.0       TSH   Date Value Ref Range Status   12/15/2022 1.730 0.450 - 4.500 uIU/mL Final        POCT Results (if applicable):   Results for orders placed or performed in visit on 05/31/23   POC Urinalysis Dipstick    Specimen: Urine   Result Value Ref Range    Color Yellow Yellow, Straw, Dark Yellow, Niki    Clarity, UA Cloudy (A) Clear    Glucose, UA Negative Negative mg/dL    Bilirubin Negative Negative    Ketones, UA Negative Negative    Specific Gravity  1.020 1.005 - 1.030    Blood, UA 3+ (A) Negative    pH, Urine 6.0 5.0 - 8.0    Protein, POC Trace (A) Negative mg/dL    Urobilinogen, UA 0.2 E.U./dL Normal, 0.2 E.U./dL    Leukocytes Moderate (2+) (A) Negative    Nitrite, UA Negative Negative       Imaging:   No valid procedures " specified.       Assessment / Plan      Assessment/Plan:   Diagnoses and all orders for this visit:    1. Urinary tract infection with hematuria, site unspecified (Primary)  -     POC Urinalysis Dipstick  -     Urinalysis With Culture If Indicated -; Future  -     Cefixime (SUPRAX) 400 MG capsule; Take 1 capsule by mouth Daily.  Dispense: 7 capsule; Refill: 7  Treating empirically with cefixime rather than IM Rocephin given allergy to penicillin to reduce risk of anaphylaxis, also adding doxycycline as detailed below for possible chlamydia.  (Addendum: Notified by pharmacy that cefixime not covered.  We will switch to cefdinir 300 mg twice daily x7 days).  2. Urethritis  -     Urinalysis With Culture If Indicated -; Future  -     Chlamydia trachomatis, Neisseria gonorrhoeae, PCR - , Urine, Random Void; Future  -     HIV-1 / O / 2 Ag / Antibody 4th Generation; Future  -     VIRAL HEPATITIS HBV, HCV; Future  -     RPR; Future  -     HSV 1 & 2 - Specific Antibody, IgG; Future  -     Cefixime (SUPRAX) 400 MG capsule; Take 1 capsule by mouth Daily.  Dispense: 7 capsule; Refill: 7  -     doxycycline (VIBRAMYCIN) 100 MG capsule; Take 1 capsule by mouth 2 (Two) Times a Day for 7 days.  Dispense: 14 capsule; Refill: 0  Checking full STD profile along with routine urinalysis with culture as indicated, treating empirically with cefixime in place of IM Rocephin given allergy to penicillin for reduction in risk of anaphylaxis, also adding doxycycline empirically.  (Addendum: Notified by pharmacy that cefixime not covered.  We will switch to cefdinir 300 mg twice daily x7 days).  Discussed safe sex practices.      Follow Up:   Return if symptoms worsen or fail to improve, for Labs today.      At [facility], we believe that sharing information builds trust and better relationships. You are receiving this note because you recently visited [facility]. It is possible you will see health information before a provider has  talked with you about it. This kind of information can be easy to misunderstand. To help you fully understand what it means for your health, we urge you to discuss this note with your provider.    Luke Graham MD  [facility]

## 2023-06-01 LAB
HBV CORE AB SERPL QL IA: NEGATIVE
HBV CORE AB SERPL QL IA: NEGATIVE
HBV SURFACE AB SER QL: NON REACTIVE
HBV SURFACE AB SER QL: NON REACTIVE
HBV SURFACE AG SERPL QL IA: NEGATIVE
HBV SURFACE AG SERPL QL IA: NEGATIVE
HCV AB SERPL QL IA: NORMAL
HCV AB SERPL QL IA: NORMAL
HCV IGG SERPL QL IA: NON REACTIVE
HCV IGG SERPL QL IA: NON REACTIVE
HIV 1+2 AB+HIV1 P24 AG SERPL QL IA: NON REACTIVE
HSV1 IGG SER IA-ACNC: 1.08 INDEX (ref 0–0.9)
HSV1 IGG SER IA-ACNC: 1.11 INDEX (ref 0–0.9)
HSV2 IGG SER IA-ACNC: <0.91 INDEX (ref 0–0.9)
HSV2 IGG SER IA-ACNC: <0.91 INDEX (ref 0–0.9)
RPR SER QL: NON REACTIVE
RPR SER QL: NON REACTIVE

## 2023-06-05 ENCOUNTER — TELEPHONE (OUTPATIENT)
Dept: FAMILY MEDICINE CLINIC | Facility: CLINIC | Age: 23
End: 2023-06-05
Payer: COMMERCIAL

## 2023-06-05 LAB
APPEARANCE UR: ABNORMAL
BACTERIA #/AREA URNS HPF: ABNORMAL /[HPF]
BACTERIA UR CULT: ABNORMAL
BACTERIA UR CULT: ABNORMAL
BILIRUB UR QL STRIP: NEGATIVE
CASTS URNS QL MICRO: ABNORMAL /LPF
COLOR UR: YELLOW
EPI CELLS #/AREA URNS HPF: ABNORMAL /HPF (ref 0–10)
GLUCOSE UR QL STRIP: NEGATIVE
HGB UR QL STRIP: ABNORMAL
KETONES UR QL STRIP: NEGATIVE
LEUKOCYTE ESTERASE UR QL STRIP: ABNORMAL
MICRO URNS: ABNORMAL
NITRITE UR QL STRIP: POSITIVE
OTHER ANTIBIOTIC SUSC ISLT: ABNORMAL
PH UR STRIP: 6 [PH] (ref 5–7.5)
PROT UR QL STRIP: ABNORMAL
RBC #/AREA URNS HPF: >30 /HPF (ref 0–2)
REQUEST PROBLEM: ABNORMAL
SP GR UR STRIP: 1.02 (ref 1–1.03)
URINALYSIS REFLEX: ABNORMAL
UROBILINOGEN UR STRIP-MCNC: 0.2 MG/DL (ref 0.2–1)
WBC #/AREA URNS HPF: >30 /HPF (ref 0–5)

## 2023-06-05 NOTE — TELEPHONE ENCOUNTER
Phone conversation with patient regarding results of laboratory testing from 5/31/2023:    HIV negative  RPR nonreactive  Viral hepatitis screen for hep B and C all normal/negative  HSV type I titer positive, HSV type II titer negative  Urinalysis revealing cloudy urine, positive nitrates, positive leukocytes, 1+ protein, 3+ blood, with scopic revealing greater than 30 WBCs, greater than 30 RBCs, with many bacteria, subsequent culture growing greater than 100,000 colonies of Citrobacter koseri, sensitive to all antibiotics tested including cephalosporins  Urine Gen-Probe/PCR for gonorrhea and chlamydia were not run by lab for undisclosed reasons    Assessment/medical  1.  Citrobacter UTI.  Patient prescribed cefdinir, should cover this pathogen based upon sensitivity to all cephalosporins, patient indicating clinically that his symptoms are resolving nicely.  2.  Remainder of tested STDs all negative/normal.  3.  GC/chlamydia PCR study not performed by lab for undisclosed reasons, however patient had been prescribed cefdinir as well as doxycycline empirically which should adequately treat both of these potential pathogens, thus will not met repeat specimen at this time as test results would likely be negative at this stage based upon antibiotics prescribed.

## 2023-10-22 ENCOUNTER — HOSPITAL ENCOUNTER (EMERGENCY)
Facility: HOSPITAL | Age: 23
Discharge: HOME | End: 2023-10-22
Attending: EMERGENCY MEDICINE | Admitting: EMERGENCY MEDICINE
Payer: COMMERCIAL

## 2023-10-22 VITALS
BODY MASS INDEX: 22.88 KG/M2 | OXYGEN SATURATION: 98 % | HEIGHT: 68 IN | WEIGHT: 151 LBS | DIASTOLIC BLOOD PRESSURE: 65 MMHG | TEMPERATURE: 97.7 F | SYSTOLIC BLOOD PRESSURE: 108 MMHG | RESPIRATION RATE: 17 BRPM | HEART RATE: 73 BPM

## 2023-10-22 DIAGNOSIS — N30.91 HEMORRHAGIC CYSTITIS: Primary | ICD-10-CM

## 2023-10-22 LAB
BACTERIA UR QL AUTO: ABNORMAL /HPF
BILIRUB UR QL STRIP: NEGATIVE
CLARITY UR: ABNORMAL
COLOR UR: ABNORMAL
GLUCOSE UR STRIP-MCNC: NEGATIVE MG/DL
HGB UR QL STRIP.AUTO: ABNORMAL
HYALINE CASTS UR QL AUTO: ABNORMAL /LPF
KETONES UR QL STRIP: NEGATIVE
LEUKOCYTE ESTERASE UR QL STRIP.AUTO: ABNORMAL
NITRITE UR QL STRIP: NEGATIVE
PH UR STRIP.AUTO: 7.5 [PH] (ref 5–8)
PROT UR QL STRIP: ABNORMAL
RBC # UR STRIP: ABNORMAL /HPF
REF LAB TEST METHOD: ABNORMAL
SP GR UR STRIP: 1.01 (ref 1–1.03)
SQUAMOUS #/AREA URNS HPF: ABNORMAL /HPF
UROBILINOGEN UR QL STRIP: ABNORMAL
WBC # UR STRIP: ABNORMAL /HPF

## 2023-10-22 PROCEDURE — 87491 CHLMYD TRACH DNA AMP PROBE: CPT

## 2023-10-22 PROCEDURE — 87591 N.GONORRHOEAE DNA AMP PROB: CPT

## 2023-10-22 PROCEDURE — 51798 US URINE CAPACITY MEASURE: CPT

## 2023-10-22 PROCEDURE — 99283 EMERGENCY DEPT VISIT LOW MDM: CPT

## 2023-10-22 PROCEDURE — 81001 URINALYSIS AUTO W/SCOPE: CPT

## 2023-10-22 RX ORDER — PHENAZOPYRIDINE HYDROCHLORIDE 200 MG/1
200 TABLET, FILM COATED ORAL 3 TIMES DAILY PRN
Qty: 6 TABLET | Refills: 0 | Status: SHIPPED | OUTPATIENT
Start: 2023-10-22

## 2023-10-22 RX ORDER — CEFUROXIME AXETIL 500 MG/1
500 TABLET ORAL 2 TIMES DAILY
Qty: 20 TABLET | Refills: 0 | Status: SHIPPED | OUTPATIENT
Start: 2023-10-22 | End: 2023-11-01

## 2023-10-22 RX ORDER — CEFUROXIME AXETIL 250 MG/1
500 TABLET ORAL ONCE
Status: COMPLETED | OUTPATIENT
Start: 2023-10-22 | End: 2023-10-22

## 2023-10-22 RX ADMIN — CEFUROXIME AXETIL 500 MG: 250 TABLET ORAL at 13:28

## 2023-10-22 NOTE — DISCHARGE INSTRUCTIONS
As discussed your urine is grossly bloody and the lab is unable to determine the presence of bacteria or white blood cells.  With your absence of flank and abdominal pain, suspect underlying hemorrhagic cystitis as the cause.  First dose of antibiotics provided in the ER.   the prescription and take the second dose later today and then complete as directed.  Can also use the Pyridium as needed to help with treating the UTI.  Follow-up with your primary provider or urologist for reevaluation return to the ER for any new or worsening symptoms.

## 2023-10-22 NOTE — ED PROVIDER NOTES
Subjective:  History of Present Illness:    Patient is a 23-year-old male here today with hematuria.  He is accompanied by his family.  Patient states for the past couple days he has had increasing color change to his urine due to blood.  He denies dysuria, but does note an increase to his urinary frequency and urinating in small amounts.  He does feel that when he is able to he cannot completely empty his bladder.  No associated nausea, vomiting, or fever.  Denies abdominal and flank pain and denies a history of kidney stones.  Patient states that he initially urinates blood and when the stream in his it is only urine.  Mother states that the patient was born with hypospadias, had a corrective surgery at the age of 3, and since then has intermittently followed up with urologist for reevaluations.  He has had to have a stent placed to help with hair-like follicles that had to be removed causing hematuria in the past.      Nurses Notes reviewed and agree, including vitals, allergies, social history and prior medical history.     REVIEW OF SYSTEMS: All systems reviewed and not pertinent unless noted.  Review of Systems    Past Medical History:   Diagnosis Date    Allergic asthma     Allergic conjunctivitis     Allergic rhinitis     Dysuria 05/2020    Epigastric pain     Mild intermittent asthma     Nausea     Other fatigue     Pain in thoracic spine     Intermittent left lateral thoracic muscle spasms, most recently evaluated 12/2019       Allergies:    Penicillins and Amoxil [amoxicillin]      Past Surgical History:   Procedure Laterality Date    ORCHIOPEXY           Social History     Socioeconomic History    Marital status: Single   Tobacco Use    Smoking status: Never    Smokeless tobacco: Never   Vaping Use    Vaping Use: Never used   Substance and Sexual Activity    Alcohol use: Not Currently    Drug use: Never    Sexual activity: Defer         History reviewed. No pertinent family  "history.    Objective  Physical Exam:  /65 (BP Location: Left arm, Patient Position: Sitting)   Pulse 73   Temp 97.7 °F (36.5 °C) (Oral)   Resp 17   Ht 172.7 cm (68\")   Wt 68.5 kg (151 lb)   SpO2 98%   BMI 22.96 kg/m²      Physical Exam  Vitals and nursing note reviewed.   Constitutional:       Appearance: Normal appearance. He is normal weight.   HENT:      Head: Normocephalic and atraumatic.   Cardiovascular:      Rate and Rhythm: Normal rate and regular rhythm.      Pulses: Normal pulses.      Heart sounds: Normal heart sounds.   Pulmonary:      Effort: Pulmonary effort is normal.      Breath sounds: Normal breath sounds.   Abdominal:      General: Abdomen is flat. Bowel sounds are normal. There is no distension.      Palpations: Abdomen is soft.      Tenderness: There is no abdominal tenderness. There is no right CVA tenderness or left CVA tenderness.   Musculoskeletal:      Right lower leg: No edema.      Left lower leg: No edema.   Skin:     General: Skin is warm and dry.      Capillary Refill: Capillary refill takes less than 2 seconds.   Neurological:      General: No focal deficit present.      Mental Status: He is alert and oriented to person, place, and time.   Psychiatric:         Mood and Affect: Mood normal.         Behavior: Behavior normal.         Procedures    ED Course:         Lab Results (last 24 hours)       Procedure Component Value Units Date/Time    Urinalysis With Microscopic If Indicated (No Culture) - Urine, Clean Catch [[id number]]  (Abnormal) Collected: 10/22/23 1245    Specimen: Urine, Clean Catch Updated: 10/22/23 1252     Color, UA Red     Appearance, UA Turbid     pH, UA 7.5     Specific Gravity, UA 1.013     Glucose, UA Negative     Ketones, UA Negative     Bilirubin, UA Negative     Blood, UA Large (3+)     Protein,  mg/dL (2+)     Leuk Esterase, UA Large (3+)     Nitrite, UA Negative     Urobilinogen, UA 1.0 E.U./dL    Urinalysis, Microscopic Only - Urine, Clean " Catch [[id number]]  (Abnormal) Collected: 10/22/23 1245    Specimen: Urine, Clean Catch Updated: 10/22/23 1257     RBC, UA Too Numerous to Count /HPF      WBC, UA Too Numerous to Count /HPF      Bacteria, UA       Unable to determine due to loaded field     /HPF     Squamous Epithelial Cells, UA       Unable to determine due to loaded field     /HPF     Hyaline Casts, UA       Unable to determine due to loaded field     /LPF     Methodology Manual Light Microscopy    Chlamydia trachomatis, Neisseria gonorrhoeae, PCR - Urine, Urine, Clean Catch [[id number]] Collected: 10/22/23 1245    Specimen: Urine, Clean Catch Updated: 10/22/23 1322             No radiology results from the last 24 hrs       MDM      Initial impression of presenting illness: Patient is a 23-year-old male here today with hematuria.    DDX: includes but is not limited to: Cystitis, pyelonephritis, renal lithiasis, among others    Patient arrives hemodynamically stable with vitals interpreted by myself.     Pertinent features from physical exam: Nontender abdomen.    Initial diagnostic plan: Urinalysis    Results from initial plan were reviewed and interpreted by me revealing gross hematuria with the inability to determine the white blood cells and bacteria    Diagnostic information from other sources: Medical record, mother    Interventions / Re-evaluation: Provided the patient with his first dose of cefuroxime    Results/clinical rationale were discussed with Dr. Greene and he reviewed the UA results.  Recommended treating with a cephalosporin.  Provided the patient with his first dose of cefuroxime and a prescription to take at home.  Also a prescription for Pyridium.  Advised him to follow-up with his primary provider or urologist for reevaluation.    Consultations/Discussion of results with other physicians: None    Disposition plan: Patient discharged home in stable condition  -----        Final diagnoses:   Hemorrhagic cystitis          [place],  [place] QUIROGA, APRN  10/22/23 1359

## 2023-10-24 LAB
C TRACH RRNA SPEC QL NAA+PROBE: NEGATIVE
N GONORRHOEA RRNA SPEC QL NAA+PROBE: NEGATIVE

## 2024-02-21 DIAGNOSIS — F41.1 GENERALIZED ANXIETY DISORDER: ICD-10-CM

## 2024-02-21 DIAGNOSIS — F34.1 DYSTHYMIA: ICD-10-CM

## 2024-02-21 RX ORDER — ESCITALOPRAM OXALATE 10 MG/1
10 TABLET ORAL DAILY
Qty: 30 TABLET | Refills: 2 | Status: SHIPPED | OUTPATIENT
Start: 2024-02-21

## 2024-03-18 ENCOUNTER — OFFICE VISIT (OUTPATIENT)
Dept: FAMILY MEDICINE CLINIC | Facility: CLINIC | Age: 24
End: 2024-03-18
Payer: COMMERCIAL

## 2024-03-18 VITALS
HEART RATE: 82 BPM | SYSTOLIC BLOOD PRESSURE: 118 MMHG | TEMPERATURE: 97.8 F | BODY MASS INDEX: 22.9 KG/M2 | WEIGHT: 154.6 LBS | DIASTOLIC BLOOD PRESSURE: 76 MMHG | OXYGEN SATURATION: 99 % | HEIGHT: 69 IN

## 2024-03-18 DIAGNOSIS — F34.1 DYSTHYMIA: ICD-10-CM

## 2024-03-18 DIAGNOSIS — Z00.01 ENCOUNTER FOR GENERAL ADULT MEDICAL EXAMINATION WITH ABNORMAL FINDINGS: Primary | ICD-10-CM

## 2024-03-18 DIAGNOSIS — Z13.29 SCREENING FOR THYROID DISORDER: ICD-10-CM

## 2024-03-18 DIAGNOSIS — Z23 NEED FOR TDAP VACCINATION: ICD-10-CM

## 2024-03-18 DIAGNOSIS — E78.5 DYSLIPIDEMIA: ICD-10-CM

## 2024-03-18 DIAGNOSIS — F41.1 GENERALIZED ANXIETY DISORDER: ICD-10-CM

## 2024-03-18 DIAGNOSIS — Z13.1 SCREENING FOR DIABETES MELLITUS: ICD-10-CM

## 2024-03-18 DIAGNOSIS — F17.290 VAPING NICOTINE DEPENDENCE, TOBACCO PRODUCT: ICD-10-CM

## 2024-03-18 DIAGNOSIS — Z11.3 SCREEN FOR STD (SEXUALLY TRANSMITTED DISEASE): ICD-10-CM

## 2024-03-18 DIAGNOSIS — F10.10 ALCOHOL CONSUMPTION BINGE DRINKING: ICD-10-CM

## 2024-03-18 DIAGNOSIS — Z23 NEED FOR PROPHYLACTIC VACCINATION AGAINST STREPTOCOCCUS PNEUMONIAE (PNEUMOCOCCUS): ICD-10-CM

## 2024-03-18 DIAGNOSIS — Z72.52 HIGH RISK HOMOSEXUAL BEHAVIOR: ICD-10-CM

## 2024-03-18 NOTE — ASSESSMENT & PLAN NOTE
Patient reports some dysthymia with screening questionnaire indicating mild depression, this taking Lexapro 10 mg daily.  Given detached affect on the Lexapro, will initiate sertraline 50 mg at 0.5 tablets daily for 6 days then increase to 1 tablet daily while simultaneously cross tapering off Lexapro 10 mg, initially taking reduced dose of 0.5 tablets or 5 mg daily for 6 days then 0.5 tablets every other day for 2 weeks then stop.  Reassess clinically in 6 weeks, advising if problems in the interim.

## 2024-03-18 NOTE — ASSESSMENT & PLAN NOTE
23-year-old male in overall good general health, mood disorders addressed as detailed below, update vaccinations, update laboratory testing.  Advised to discontinue vaping habit and to avoid binge alcohol consumption.

## 2024-03-18 NOTE — ASSESSMENT & PLAN NOTE
Patient reports ongoing symptoms of anxiety to mild degree as well as moderate on AD-7 anxiety screen, with detached sensation on the Lexapro 10 mg daily.  As noted above, cross taper off Lexapro while starting on sertraline, reassessing clinically in 6 weeks.  Advise if problems in the interim.

## 2024-03-18 NOTE — PROGRESS NOTES
Venipuncture Blood Specimen Collection  Venipuncture performed in right arm by Kalyn Luna MA with good hemostasis. Patient tolerated the procedure well without complications.   03/18/24   Kalyn Luna MA

## 2024-03-18 NOTE — ASSESSMENT & PLAN NOTE
Patient reports social binge alcohol consumption typically on weekends only.  I did discuss with patient this still represents high risk behavior and advised to significantly curtail his alcohol consumption to no more than 1-2 drinks daily and ideally only several times weekly.

## 2024-03-18 NOTE — ASSESSMENT & PLAN NOTE
Patient reports high risk lifestyle with homosexual interactions not utilizing barrier protection.  Will check standard STD screens and discussed need for safe sex practices.

## 2024-03-18 NOTE — PROGRESS NOTES
Male Physical Note      Date: 2024   Patient Name: Yoandy Merida  : 2000   MRN: [id number]     Chief Complaint:    Chief Complaint   Patient presents with    Annual Exam     Follow up lexapro       History of Present Illness: Yoandy Merida is a 23 y.o. male who is here today for their annual health maintenance and physical.  Patient has a history of anxiety and dysthymia for which he takes Lexapro 10 mg daily and Atarax 25 mg as needed, having been on this regimen for over a year now.  He does indicate the anxiety is well-controlled as is his dysthymia but he has a detached feeling with the medication with lack of motivation.  Also has some delayed sleep for several hours, admittedly using his cell phone at bedtime.  History of allergic rhinitis allergic conjunctivitis and asthma generally well-controlled taking his montelukast 10 mg nightly, Zyrtec 10 mg daily as needed and albuterol inhaler.  He is requesting STD testing being homosexual and at times not using barrier protection.  Does not have a current stable partner.  Does vape nicotine periodically drinks alcohol anywhere from 2-6 beverages on weekends typically.  Health maintenance includes need for update of several vaccinations along with laboratory testing.      Subjective      Review of Systems:   Review of Systems   Constitutional: Negative.    HENT:          Rare headache and post cervical stiffness   Eyes: Negative.    Respiratory: Negative.     Cardiovascular: Negative.    Gastrointestinal: Negative.    Genitourinary: Negative.    Musculoskeletal:  Positive for neck pain and neck stiffness. Negative for back pain and myalgias.        Rare left post cervical neck stiffness   Neurological: Negative.    Psychiatric/Behavioral:  Positive for dysphoric mood. The patient is nervous/anxious.         Detached affect with lack of motivation periodically, occasional insomnia       Past Medical History, Social History, Family History and Care Team  were all reviewed with patient and updated as appropriate.     Medications:     Current Outpatient Medications:     hydrOXYzine (ATARAX) 25 MG tablet, 1/2 to 1 tablet 3 times daily as needed for anxiety or insomnia, Disp: 45 tablet, Rfl: 1    montelukast (SINGULAIR) 10 MG tablet, Take 1 tablet by mouth Every Night. For prevention of allergies, Disp: 30 tablet, Rfl: 6    sertraline (Zoloft) 50 MG tablet, 0.5 tablets once daily for 6 days then 1 tablet daily, Disp: 30 tablet, Rfl: 1    Allergies:   Allergies   Allergen Reactions    Penicillins Hives    Amoxil [Amoxicillin] Rash       Immunizations:  Health Maintenance Summary            Postponed - COVID-19 Vaccine (5 - 2023-24 season) Postponed until 3/20/2024      03/18/2024  Postponed until 3/20/2024 by Karen José (Patient Refused - at later date)    04/17/2023  Postponed until 9/1/2023 by Karen José (Pending event)    11/04/2022  Imm Admin: Covid-19 (Pfizer) Gray Cap Monovalent    11/11/2021  Imm Admin: COVID-19 (PFIZER) Purple Cap Monovalent    04/14/2021  Imm Admin: COVID-19 (PFIZER) Purple Cap Monovalent    Only the first 5 history entries have been loaded, but more history exists.              Postponed - INFLUENZA VACCINE (Yearly - August to March) Postponed until 3/31/2024      03/18/2024  Postponed until 3/31/2024 by Karen José (Patient Refused - refused)    02/06/2019  Imm Admin: Flu Vaccine Quad PF >36MO              ANNUAL PHYSICAL (Yearly) Next due on 3/18/2025      03/18/2024  Done    04/18/2023  Postponed until 11/1/2023 by Karen José (Not Indicated)              TDAP/TD VACCINES (3 - Td or Tdap) Next due on 3/18/2034      03/18/2024  Imm Admin: Tdap    07/23/2013  Imm Admin: Tdap              MENINGOCOCCAL VACCINE (Series Information) Completed      08/07/2018  Imm Admin: Meningococcal MCV4P (Menactra)    07/23/2013  Imm Admin: MCV4 Unspecified    07/23/2013  Imm Admin: Meningococcal MCV4P (Menactra)              HPV  "VACCINES (Series Information) Completed      02/06/2019  Imm Admin: Hpv9    08/07/2018  Imm Admin: Hpv9    06/12/2018  Imm Admin: Hpv9              HEPATITIS C SCREENING  Completed      05/31/2023  Done - Negative/normal    04/18/2023  Postponed until 11/1/2023 by Karen José (Pending event)              Pneumococcal Vaccine 0-64 (Series Information) Completed      03/18/2024  Imm Admin: Pneumococcal Conjugate 20-Valent (PCV20)    04/18/2023  Postponed until 11/1/2023 by Karen José (Pending event)    10/28/2003  Imm Admin: PEDS-Pneumococcal Conjugate (PCV7)    04/29/2003  Imm Admin: PEDS-Pneumococcal Conjugate (PCV7)    2002  Imm Admin: PEDS-Pneumococcal Conjugate (PCV7)    Only the first 5 history entries have been loaded, but more history exists.                    Orders Placed This Encounter   Procedures    Pneumococcal Conjugate Vaccine 20-Valent All    Tdap Vaccine Greater Than or Equal To 6yo IM       Colorectal Screening:   N/A  Last Completed Colonoscopy       This patient has no relevant Health Maintenance data.          CT for Smoker (Age 50-80, 20pk yr within last 15 years): N/A  Bone Density/DEXA (high risk): N/A  Hep C (Age 18-79 once): Pending  HIV (Age 15-65 once) : Pending  PSA (Over age 50, C Level Recommendation): N/A  US Aorta (For male smokers, age 65): N/A  A1c:   Hemoglobin A1C   Date Value Ref Range Status   12/15/2022 5.0 4.8 - 5.6 % Final     Comment:              Prediabetes: 5.7 - 6.4           Diabetes: >6.4           Glycemic control for adults with diabetes: <7.0       Lipid panel: No results found for: \"LABLIPI\"    The ASCVD Risk score (Kalina DK, et al., 2019) failed to calculate for the following reasons:    The 2019 ASCVD risk score is only valid for ages 40 to 79    Dermatology: N/A  Ophthalmologist: Routine checkups pursued  Dentist: Routine checkups pursued    Tobacco Use: Low Risk  (3/18/2024)    Patient History     Smoking Tobacco Use: Never     Smokeless " "Tobacco Use: Never     Passive Exposure: Not on file       Social History     Substance and Sexual Activity   Alcohol Use Not Currently        Social History     Substance and Sexual Activity   Drug Use Never        Diet/Physical activity: Fair diet, physically active    Sexual health: No concern, no contraception used or required given homosexual    PHQ-2 Depression Screening  PHQ-9 Total Score: 13      Measures:   Advanced Care Planning:   Patient does not have an advance directive, information provided.    Smoking Cessation:   3-10 mintues spent counseling Will try to cut down     Objective     Physical Exam:  Vital Signs:   Vitals:    03/18/24 1003   BP: 118/76   BP Location: Left arm   Patient Position: Sitting   Cuff Size: Small Adult   Pulse: 82   Temp: 97.8 °F (36.6 °C)   TempSrc: Temporal   SpO2: 99%   Weight: 70.1 kg (154 lb 9.6 oz)   Height: 174 cm (68.5\")     Facility age limit for growth %riley is 20 years.  Body mass index is 23.16 kg/m².     Physical Exam  Vitals and nursing note reviewed.   Constitutional:       Appearance: Normal appearance. He is normal weight.      Comments: Pleasant, healthy, affect appropriate, no acute distress, alert and oriented, fluent speech, BMI 23.1   HENT:      Head: Normocephalic and atraumatic.      Right Ear: Tympanic membrane, ear canal and external ear normal.      Left Ear: Tympanic membrane, ear canal and external ear normal.      Nose: Nose normal.      Mouth/Throat:      Mouth: Mucous membranes are moist.      Pharynx: Oropharynx is clear.      Comments: Good dentition  Eyes:      Extraocular Movements: Extraocular movements intact.      Conjunctiva/sclera: Conjunctivae normal.      Pupils: Pupils are equal, round, and reactive to light.   Neck:      Comments: No cervical adenopathy masses or tenderness, full range of motion, no periclavicular or axillary adenopathy  Cardiovascular:      Rate and Rhythm: Normal rate and regular rhythm.      Pulses: Normal pulses. "      Heart sounds: Normal heart sounds. No murmur heard.     No friction rub. No gallop.   Pulmonary:      Effort: Pulmonary effort is normal.      Breath sounds: Normal breath sounds.   Abdominal:      General: Abdomen is flat. Bowel sounds are normal.      Palpations: Abdomen is soft. There is no mass.      Tenderness: There is no abdominal tenderness. There is no guarding or rebound.   Genitourinary:     Comments: Circumcised male with testes descended bilaterally, no nodules or tenderness, no inguinal herniation or adenopathy, no rash, no penile discharge  Musculoskeletal:         General: No swelling, tenderness or deformity. Normal range of motion.      Cervical back: Normal range of motion and neck supple. No rigidity or tenderness.      Right lower leg: No edema.      Left lower leg: No edema.   Lymphadenopathy:      Cervical: No cervical adenopathy.   Skin:     General: Skin is warm and dry.      Capillary Refill: Capillary refill takes less than 2 seconds.      Findings: No lesion or rash.   Neurological:      General: No focal deficit present.      Mental Status: He is alert and oriented to person, place, and time. Mental status is at baseline.   Psychiatric:         Mood and Affect: Mood normal.         Behavior: Behavior normal.         Thought Content: Thought content normal.         Judgment: Judgment normal.          POCT Results (if applicable):   Results for orders placed or performed during the hospital encounter of 10/22/23   Chlamydia trachomatis, Neisseria gonorrhoeae, PCR - Urine, Urine, Clean Catch    Specimen: Urine, Clean Catch   Result Value Ref Range    Chlamydia trachomatis, [place] Negative Negative    Neisseria gonorrhoeae, [place] Negative Negative   Urinalysis With Microscopic If Indicated (No Culture) - Urine, Clean Catch    Specimen: Urine, Clean Catch   Result Value Ref Range    Color, UA Red (A) Yellow, Straw    Appearance, UA Turbid (A) Clear    pH, UA 7.5 5.0 - 8.0    Specific Gravity,  UA 1.013 1.005 - 1.030    Glucose, UA Negative Negative    Ketones, UA Negative Negative    Bilirubin, UA Negative Negative    Blood, UA Large (3+) (A) Negative    Protein,  mg/dL (2+) (A) Negative    Leuk Esterase, UA Large (3+) (A) Negative    Nitrite, UA Negative Negative    Urobilinogen, UA 1.0 E.U./dL 0.2 - 1.0 E.U./dL   Urinalysis, Microscopic Only - Urine, Clean Catch    Specimen: Urine, Clean Catch   Result Value Ref Range    RBC, UA Too Numerous to Count (A) None Seen, 0-2 /HPF    WBC, UA Too Numerous to Count (A) None Seen, 0-2 /HPF    Bacteria, UA Unable to determine due to loaded field (A) None Seen /HPF    Squamous Epithelial Cells, UA Unable to determine due to loaded field (A) None Seen, 0-2 /HPF    Hyaline Casts, UA Unable to determine due to loaded field None Seen /LPF    Methodology Manual Light Microscopy      Mood disorder questionnaire for bipolar disorder positive for 11 of 13 items, technically positive screen  ERLIN-7 screen for anxiety score of 18 consistent with moderate anxiety  PHQ-9 screen for depression score of 13 consistent with mild depression    Assessment / Plan      Assessment/Plan:   Diagnoses and all orders for this visit:    1. Encounter for general adult medical examination with abnormal findings (Primary)  Assessment & Plan:  23-year-old male in overall good general health, mood disorders addressed as detailed below, update vaccinations, update laboratory testing.  Advised to discontinue vaping habit and to avoid binge alcohol consumption.    Orders:  -     TSH Rfx On Abnormal To Free T4; Future  -     Comprehensive Metabolic Panel; Future  -     Lipid Panel; Future  -     HIV-1 / O / 2 Ag / Antibody; Future  -     Hemoglobin A1c; Future  -     Cancel: VIRAL HEPATITIS HBV, HCV; Future  -     Cancel: RPR; Future  -     Chlamydia trachomatis, Neisseria gonorrhoeae, PCR - , Urine, Clean Catch; Future  -     Chlamydia trachomatis, Neisseria gonorrhoeae, PCR - Urine, Urine,  Clean Catch  -     Cancel: RPR  -     Cancel: VIRAL HEPATITIS HBV, HCV  -     Hemoglobin A1c  -     HIV-1 / O / 2 Ag / Antibody  -     Lipid Panel  -     Comprehensive Metabolic Panel  -     TSH Rfx On Abnormal To Free T4    2. Dyslipidemia  -     Lipid Panel; Future  -     Lipid Panel    3. Dysthymia  Assessment & Plan:  Patient reports some dysthymia with screening questionnaire indicating mild depression, this taking Lexapro 10 mg daily.  Given detached affect on the Lexapro, will initiate sertraline 50 mg at 0.5 tablets daily for 6 days then increase to 1 tablet daily while simultaneously cross tapering off Lexapro 10 mg, initially taking reduced dose of 0.5 tablets or 5 mg daily for 6 days then 0.5 tablets every other day for 2 weeks then stop.  Reassess clinically in 6 weeks, advising if problems in the interim.    Orders:  -     sertraline (Zoloft) 50 MG tablet; 0.5 tablets once daily for 6 days then 1 tablet daily  Dispense: 30 tablet; Refill: 1    4. Generalized anxiety disorder  Assessment & Plan:  Patient reports ongoing symptoms of anxiety to mild degree as well as moderate on AD-7 anxiety screen, with detached sensation on the Lexapro 10 mg daily.  As noted above, cross taper off Lexapro while starting on sertraline, reassessing clinically in 6 weeks.  Advise if problems in the interim.    Orders:  -     sertraline (Zoloft) 50 MG tablet; 0.5 tablets once daily for 6 days then 1 tablet daily  Dispense: 30 tablet; Refill: 1    5. Alcohol consumption binge drinking  Assessment & Plan:  Patient reports social binge alcohol consumption typically on weekends only.  I did discuss with patient this still represents high risk behavior and advised to significantly curtail his alcohol consumption to no more than 1-2 drinks daily and ideally only several times weekly.      6. Vaping nicotine dependence, tobacco product  Assessment & Plan:  Reports vaping nicotine product.  Discussed health risks and advised to  discontinue.        7. Screen for STD (sexually transmitted disease)  Assessment & Plan:  Patient reports high risk lifestyle with homosexual interactions not utilizing barrier protection.  Will check standard STD screens and discussed need for safe sex practices.    Orders:  -     HIV-1 / O / 2 Ag / Antibody; Future  -     Cancel: VIRAL HEPATITIS HBV, HCV; Future  -     Cancel: RPR; Future  -     Chlamydia trachomatis, Neisseria gonorrhoeae, PCR - , Urine, Clean Catch; Future  -     Chlamydia trachomatis, Neisseria gonorrhoeae, PCR - Urine, Urine, Clean Catch  -     Cancel: RPR  -     Cancel: VIRAL HEPATITIS HBV, HCV  -     HIV-1 / O / 2 Ag / Antibody  -     RPR; Future  -     VIRAL HEPATITIS HBV, HCV; Future  -     VIRAL HEPATITIS HBV, HCV  -     RPR    8. High risk homosexual behavior  Assessment & Plan:  Patient reports high risk lifestyle with homosexual interactions not utilizing barrier protection.  Will check standard STD screens and discussed need for safe sex practices.      9. Screening for diabetes mellitus  -     Comprehensive Metabolic Panel; Future  -     Hemoglobin A1c; Future  -     Hemoglobin A1c  -     Comprehensive Metabolic Panel    10. Screening for thyroid disorder  -     TSH Rfx On Abnormal To Free T4; Future  -     TSH Rfx On Abnormal To Free T4    11. Need for Tdap vaccination  -     Tdap Vaccine Greater Than or Equal To 6yo IM    12. Need for prophylactic vaccination against Streptococcus pneumoniae (pneumococcus)  -     Pneumococcal Conjugate Vaccine 20-Valent All         Healthcare Maintenance:  Counseling provided based on age appropriate USPSTF guidelines.  BMI is within normal parameters. No other follow-up for BMI required.    Yoandy Merida voices understanding and acceptance of this advice and will call back with any further questions or concerns. AVS with preventive healthcare tips printed for patient.     Vaccine Counseling:  “Discussed risks/benefits to vaccination, reviewed  components of the vaccine, discussed VIS, discussed informed consent, informed consent obtained. Patient/Parent was allowed to accept or refuse vaccine. Questions answered to satisfactory state of patient/Parent. We reviewed typical age appropriate and seasonally appropriate vaccinations. Reviewed immunization history and updated state vaccination form as needed. Patient was counseled on Prevnar 20  Tdap    Follow Up:   Return in about 6 weeks (around 4/29/2024) for Recheck, Labs today.    At [facility], we believe that sharing information builds trust and better relationships. You are receiving this note because you recently visited [facility]. It is possible you will see health information before a provider has talked with you about it. This kind of information can be easy to misunderstand. To help you fully understand what it means for your health, we urge you to discuss this note with your provider.    Luke Graham MD  [facility] [place] GAYE Mathews

## 2024-03-19 LAB
ALBUMIN SERPL-MCNC: 4.7 G/DL (ref 4.3–5.2)
ALBUMIN/GLOB SERPL: 1.6 {RATIO} (ref 1.2–2.2)
ALP SERPL-CCNC: 79 IU/L (ref 44–121)
ALT SERPL-CCNC: 37 IU/L (ref 0–44)
AST SERPL-CCNC: 24 IU/L (ref 0–40)
BILIRUB SERPL-MCNC: 0.6 MG/DL (ref 0–1.2)
BUN SERPL-MCNC: 14 MG/DL (ref 6–20)
BUN/CREAT SERPL: 15 (ref 9–20)
CALCIUM SERPL-MCNC: 9.9 MG/DL (ref 8.7–10.2)
CHLORIDE SERPL-SCNC: 102 MMOL/L (ref 96–106)
CHOLEST SERPL-MCNC: 176 MG/DL (ref 100–199)
CO2 SERPL-SCNC: 23 MMOL/L (ref 20–29)
CREAT SERPL-MCNC: 0.96 MG/DL (ref 0.76–1.27)
EGFRCR SERPLBLD CKD-EPI 2021: 115 ML/MIN/1.73
GLOBULIN SER CALC-MCNC: 2.9 G/DL (ref 1.5–4.5)
GLUCOSE SERPL-MCNC: 88 MG/DL (ref 70–99)
HBA1C MFR BLD: 5.2 % (ref 4.8–5.6)
HBV CORE AB SERPL QL IA: NEGATIVE
HBV SURFACE AB SER QL: NON REACTIVE
HBV SURFACE AG SERPL QL IA: NEGATIVE
HCV AB SERPL QL IA: NORMAL
HCV IGG SERPL QL IA: NON REACTIVE
HDLC SERPL-MCNC: 67 MG/DL
HIV 1+2 AB+HIV1 P24 AG SERPL QL IA: NON REACTIVE
LDLC SERPL CALC-MCNC: 95 MG/DL (ref 0–99)
POTASSIUM SERPL-SCNC: 4.2 MMOL/L (ref 3.5–5.2)
PROT SERPL-MCNC: 7.6 G/DL (ref 6–8.5)
RPR SER QL: NON REACTIVE
SODIUM SERPL-SCNC: 139 MMOL/L (ref 134–144)
TRIGL SERPL-MCNC: 74 MG/DL (ref 0–149)
TSH SERPL DL<=0.005 MIU/L-ACNC: 2.59 UIU/ML (ref 0.45–4.5)
VLDLC SERPL CALC-MCNC: 14 MG/DL (ref 5–40)

## 2024-03-20 ENCOUNTER — TELEPHONE (OUTPATIENT)
Dept: FAMILY MEDICINE CLINIC | Facility: CLINIC | Age: 24
End: 2024-03-20
Payer: COMMERCIAL

## 2024-03-20 LAB
C TRACH RRNA SPEC QL NAA+PROBE: NEGATIVE
N GONORRHOEA RRNA SPEC QL NAA+PROBE: NEGATIVE

## 2024-03-20 NOTE — TELEPHONE ENCOUNTER
Please advise patient that recently obtained laboratory testing was all satisfactory including STD screening and sees syphilis, gonorrhea, chlamydia, HIV, and viral hepatitis profile), other testing also normal including thyroid, chemistry profile, cholesterol, and diabetic screen.

## 2024-03-20 NOTE — TELEPHONE ENCOUNTER
I have left him a voice mail regarding his lab results. I have asked that he call if he has any questions. TF

## 2024-03-22 DIAGNOSIS — J30.1 SEASONAL ALLERGIC RHINITIS DUE TO POLLEN: ICD-10-CM

## 2024-03-22 RX ORDER — MONTELUKAST SODIUM 10 MG/1
10 TABLET ORAL NIGHTLY
Qty: 30 TABLET | Refills: 5 | Status: SHIPPED | OUTPATIENT
Start: 2024-03-22

## 2024-04-29 ENCOUNTER — OFFICE VISIT (OUTPATIENT)
Dept: FAMILY MEDICINE CLINIC | Facility: CLINIC | Age: 24
End: 2024-04-29
Payer: COMMERCIAL

## 2024-04-29 VITALS
OXYGEN SATURATION: 98 % | BODY MASS INDEX: 23.05 KG/M2 | HEART RATE: 54 BPM | DIASTOLIC BLOOD PRESSURE: 70 MMHG | SYSTOLIC BLOOD PRESSURE: 110 MMHG | TEMPERATURE: 97.4 F | RESPIRATION RATE: 18 BRPM | HEIGHT: 69 IN | WEIGHT: 155.6 LBS

## 2024-04-29 DIAGNOSIS — J45.20 MILD INTERMITTENT EXTRINSIC ASTHMA WITHOUT COMPLICATION: ICD-10-CM

## 2024-04-29 DIAGNOSIS — F41.1 GENERALIZED ANXIETY DISORDER: Primary | ICD-10-CM

## 2024-04-29 DIAGNOSIS — F34.1 DYSTHYMIA: ICD-10-CM

## 2024-04-29 DIAGNOSIS — Z87.891 HISTORY OF NICOTINE VAPING: ICD-10-CM

## 2024-04-29 DIAGNOSIS — J30.1 SEASONAL ALLERGIC RHINITIS DUE TO POLLEN: ICD-10-CM

## 2024-04-29 PROCEDURE — 1159F MED LIST DOCD IN RCRD: CPT | Performed by: INTERNAL MEDICINE

## 2024-04-29 PROCEDURE — 99214 OFFICE O/P EST MOD 30 MIN: CPT | Performed by: INTERNAL MEDICINE

## 2024-04-29 PROCEDURE — 1160F RVW MEDS BY RX/DR IN RCRD: CPT | Performed by: INTERNAL MEDICINE

## 2024-04-29 RX ORDER — ALBUTEROL SULFATE 90 UG/1
2 AEROSOL, METERED RESPIRATORY (INHALATION) EVERY 4 HOURS PRN
COMMUNITY

## 2024-04-29 RX ORDER — FLUTICASONE PROPIONATE 50 MCG
2 SPRAY, SUSPENSION (ML) NASAL DAILY
Qty: 16 ML | Refills: 5 | Status: SHIPPED | OUTPATIENT
Start: 2024-04-29

## 2024-04-29 RX ORDER — CETIRIZINE HYDROCHLORIDE 10 MG/1
10 TABLET ORAL DAILY
Qty: 30 TABLET | Refills: 5 | Status: SHIPPED | OUTPATIENT
Start: 2024-04-29

## 2024-04-29 NOTE — ASSESSMENT & PLAN NOTE
Reports good control of his asthma taking Singulair 10 mg nightly prophylaxis.  Use albuterol as needed for any breakthrough symptoms.

## 2024-04-29 NOTE — ASSESSMENT & PLAN NOTE
Mild breakthrough symptoms.  Continue Singulair 10 mg nightly adding cetirizine and Flonase.  Advise if not improving.

## 2024-04-29 NOTE — ASSESSMENT & PLAN NOTE
Patient reports having discontinued his nicotine vaping habit within the last month.  Encouraged ongoing abstinence for health risk reduction.

## 2024-04-29 NOTE — ASSESSMENT & PLAN NOTE
"Patient reports improved and overall satisfactory subjective control of his anxiety taking sertraline 50 mg daily, having previously been transitioned off Lexapro 10 mg daily given \"detached\" sensation when taking Lexapro.  Continue his sertraline 50 mg daily regimen ongoing.  "

## 2024-04-29 NOTE — ASSESSMENT & PLAN NOTE
"Patient reports good clinical control of symptoms taking sertraline 50 mg daily, having been transitioned from previous prescription of Lexapro 10 mg daily given the latter medication causing a \"detached\" feeling.  Will continue sertraline 50 mg daily regimen ongoing.  "

## 2024-04-29 NOTE — PROGRESS NOTES
"    Follow Up Office Visit      Date: 2024   Patient Name: Yoandy Merida  : 2000   MRN: [id number]     Chief Complaint:    Chief Complaint   Patient presents with    Follow-up       History of Present Illness: Yoandy Merida is a 23 y.o. male who is here today for follow-up of his anxiety and dysthymia, last visit just over a month ago having been transitioned off Lexapro and onto sertraline currently taking 50 mg daily,, noting previously having a \"detached\" sensation when taking the Lexapro.  He is quite pleased with how things are doing on the sertraline 50 mg daily, having overall significant improvement in his anxiety level and resolution of the dysthymia.  No significant side effects, and feels like he is doing well on his current regimen not needing to have any dosage adjustment.  Also has allergic rhinitis and asthma taking Singulair 10 mg nightly prophylaxis, having some mild allergic rhinitis symptoms breaking through but asthma doing well.  He is not using any consistent allergy medications other than the Singulair.  He quit vaping in the last month..  Patient mentions that he is graduating from [employer] this spring and will be relocating to another city within the next month for employment reasons.     Subjective      Review of Systems:   Review of Systems    I have reviewed the patients family history, social history, past medical history, past surgical history and have updated it as appropriate.     Medications:     Current Outpatient Medications:     hydrOXYzine (ATARAX) 25 MG tablet, 1/2 to 1 tablet 3 times daily as needed for anxiety or insomnia, Disp: 45 tablet, Rfl: 1    montelukast (SINGULAIR) 10 MG tablet, TAKE 1 TABLET BY MOUTH EVERY NIGHT FOR PREVENTION OF ALLERGIES, Disp: 30 tablet, Rfl: 5    sertraline (Zoloft) 50 MG tablet, 0.5 tablets once daily for 6 days then 1 tablet daily, Disp: 30 tablet, Rfl: 1    albuterol sulfate  (90 Base) MCG/ACT inhaler, Inhale 2 " "puffs Every 4 (Four) Hours As Needed for Wheezing., Disp: , Rfl:     cetirizine (zyrTEC) 10 MG tablet, Take 1 tablet by mouth Daily., Disp: 30 tablet, Rfl: 5    fluticasone (FLONASE) 50 MCG/ACT nasal spray, 2 sprays into the nostril(s) as directed by provider Daily., Disp: 16 mL, Rfl: 5    Allergies:   Allergies   Allergen Reactions    Penicillins Hives    Amoxil [Amoxicillin] Rash       Objective     Physical Exam: Please see above  Vital Signs:   Vitals:    04/29/24 1027   BP: 110/70   BP Location: Left arm   Patient Position: Sitting   Cuff Size: Adult   Pulse: 54   Resp: 18   Temp: 97.4 °F (36.3 °C)   TempSrc: Temporal   SpO2: 98%   Weight: 70.6 kg (155 lb 9.6 oz)   Height: 174 cm (68.5\")     Body mass index is 23.31 kg/m².  BMI is within normal parameters. No other follow-up for BMI required.       Physical Exam  Constitutional:       General: He is not in acute distress.     Appearance: Normal appearance. He is normal weight. He is not ill-appearing.   HENT:      Right Ear: Tympanic membrane and ear canal normal.      Left Ear: Tympanic membrane and ear canal normal.      Nose: Congestion present. No rhinorrhea.      Comments: Mildly congested nasal turbinates with no current rhinorrhea     Mouth/Throat:      Mouth: Mucous membranes are moist.      Pharynx: Oropharynx is clear. No oropharyngeal exudate or posterior oropharyngeal erythema.   Cardiovascular:      Rate and Rhythm: Normal rate and regular rhythm.      Heart sounds: Normal heart sounds. No murmur heard.     No friction rub. No gallop.   Pulmonary:      Effort: Pulmonary effort is normal. No respiratory distress.      Breath sounds: Normal breath sounds. No stridor. No wheezing, rhonchi or rales.      Comments: No cough  Musculoskeletal:      Cervical back: No rigidity or tenderness.   Lymphadenopathy:      Cervical: No cervical adenopathy.   Neurological:      General: No focal deficit present.      Mental Status: He is alert and oriented to " person, place, and time. Mental status is at baseline.   Psychiatric:         Mood and Affect: Mood normal.         Behavior: Behavior normal.         Thought Content: Thought content normal.         Judgment: Judgment normal.         Procedures    Results:   Labs:   Hemoglobin A1C   Date Value Ref Range Status   03/18/2024 5.2 4.8 - 5.6 % Final     Comment:              Prediabetes: 5.7 - 6.4           Diabetes: >6.4           Glycemic control for adults with diabetes: <7.0       TSH   Date Value Ref Range Status   03/18/2024 2.590 0.450 - 4.500 uIU/mL Final        POCT Results (if applicable):   Results for orders placed or performed in visit on 03/18/24   Chlamydia trachomatis, Neisseria gonorrhoeae, PCR - Urine, Urine, Clean Catch    Specimen: Urine, Clean Catch    Urine  Release to paul   Result Value Ref Range    Chlamydia trachomatis, [place] Negative Negative    Neisseria gonorrhoeae, [place] Negative Negative   Hemoglobin A1c    Specimen: Arm, Right; Blood    Blood  Release to paul   Result Value Ref Range    Hemoglobin A1C 5.2 4.8 - 5.6 %   HIV-1 / O / 2 Ag / Antibody    Specimen: Arm, Right; Blood    Blood  Release to paul   Result Value Ref Range    HIV Screen 4th Gen w/RFX (Reference) Non Reactive Non Reactive   Lipid Panel    Specimen: Arm, Right; Blood    Blood  Release to paul   Result Value Ref Range    Total Cholesterol 176 100 - 199 mg/dL    Triglycerides 74 0 - 149 mg/dL    HDL Cholesterol 67 >39 mg/dL    VLDL Cholesterol Rk 14 5 - 40 mg/dL    LDL Chol Calc (NIH) 95 0 - 99 mg/dL   Comprehensive Metabolic Panel    Specimen: Arm, Right; Blood    Blood  Release to paul   Result Value Ref Range    Glucose 88 70 - 99 mg/dL    BUN 14 6 - 20 mg/dL    Creatinine 0.96 0.76 - 1.27 mg/dL    EGFR Result 115 >59 mL/min/1.73    BUN/Creatinine Ratio 15 9 - 20    Sodium 139 134 - 144 mmol/L    Potassium 4.2 3.5 - 5.2 mmol/L    Chloride 102 96 - 106 mmol/L    Total CO2 23 20 - 29 mmol/L    Calcium 9.9 8.7 - 10.2 mg/dL  "   Total Protein 7.6 6.0 - 8.5 g/dL    Albumin 4.7 4.3 - 5.2 g/dL    Globulin 2.9 1.5 - 4.5 g/dL    A/G Ratio 1.6 1.2 - 2.2    Total Bilirubin 0.6 0.0 - 1.2 mg/dL    Alkaline Phosphatase 79 44 - 121 IU/L    AST (SGOT) 24 0 - 40 IU/L    ALT (SGPT) 37 0 - 44 IU/L   TSH Rfx On Abnormal To Free T4    Specimen: Arm, Right; Blood    Blood  Release to paul   Result Value Ref Range    TSH 2.590 0.450 - 4.500 uIU/mL   VIRAL HEPATITIS HBV, HCV    Specimen: Arm, Right; Blood    Blood  Release to paul   Result Value Ref Range    Hepatitis B Surface Ag Negative Negative    Hep B S Ab Non Reactive     Hep B Core Total Ab Negative Negative    Hepatitis C Ab Non Reactive Non Reactive   RPR    Specimen: Arm, Right; Blood    Blood  Release to paul   Result Value Ref Range    RPR Non Reactive Non Reactive   Interpretation:    Blood  Release to paul   Result Value Ref Range    Interpretation Comment          Assessment / Plan      Assessment/Plan:   Diagnoses and all orders for this visit:    1. Generalized anxiety disorder (Primary)  Assessment & Plan:  Patient reports improved and overall satisfactory subjective control of his anxiety taking sertraline 50 mg daily, having previously been transitioned off Lexapro 10 mg daily given \"detached\" sensation when taking Lexapro.  Continue his sertraline 50 mg daily regimen ongoing.      2. Dysthymia  Assessment & Plan:  Patient reports good clinical control of symptoms taking sertraline 50 mg daily, having been transitioned from previous prescription of Lexapro 10 mg daily given the latter medication causing a \"detached\" feeling.  Will continue sertraline 50 mg daily regimen ongoing.      3. Seasonal allergic rhinitis due to pollen  Assessment & Plan:  Mild breakthrough symptoms.  Continue Singulair 10 mg nightly adding cetirizine and Flonase.  Advise if not improving.    Orders:  -     fluticasone (FLONASE) 50 MCG/ACT nasal spray; 2 sprays into the nostril(s) as directed by provider Daily.  " Dispense: 16 mL; Refill: 5  -     cetirizine (zyrTEC) 10 MG tablet; Take 1 tablet by mouth Daily.  Dispense: 30 tablet; Refill: 5    4. Mild intermittent extrinsic asthma without complication  Assessment & Plan:  Reports good control of his asthma taking Singulair 10 mg nightly prophylaxis.  Use albuterol as needed for any breakthrough symptoms.      5. History of nicotine vaping  Assessment & Plan:  Patient reports having discontinued his nicotine vaping habit within the last month.  Encouraged ongoing abstinence for health risk reduction.      Patient indicates he will be relocating to another city imminently for purposes of employment.  Advised that he should establish with a PCP at his near residence.  I advised I be happy to see him as needed at any time.  Follow-up as needed given plan to relocate.    Follow Up:   Return if symptoms worsen or fail to improve.      At [facility], we believe that sharing information builds trust and better relationships. You are receiving this note because you recently visited [facility]. It is possible you will see health information before a provider has talked with you about it. This kind of information can be easy to misunderstand. To help you fully understand what it means for your health, we urge you to discuss this note with your provider.    Luke Graham MD  [facility]

## 2024-06-04 DIAGNOSIS — F41.1 GENERALIZED ANXIETY DISORDER: ICD-10-CM

## 2024-06-04 DIAGNOSIS — F34.1 DYSTHYMIA: ICD-10-CM

## 2024-08-20 ENCOUNTER — OFFICE VISIT (OUTPATIENT)
Dept: FAMILY MEDICINE CLINIC | Facility: CLINIC | Age: 24
End: 2024-08-20
Payer: COMMERCIAL

## 2024-08-20 VITALS
DIASTOLIC BLOOD PRESSURE: 74 MMHG | BODY MASS INDEX: 23.34 KG/M2 | HEIGHT: 69 IN | WEIGHT: 157.6 LBS | SYSTOLIC BLOOD PRESSURE: 114 MMHG | OXYGEN SATURATION: 98 % | TEMPERATURE: 97.8 F | HEART RATE: 76 BPM

## 2024-08-20 DIAGNOSIS — Z87.891 HISTORY OF NICOTINE VAPING: ICD-10-CM

## 2024-08-20 DIAGNOSIS — F34.1 DYSTHYMIA: ICD-10-CM

## 2024-08-20 DIAGNOSIS — M25.562 ACUTE PAIN OF LEFT KNEE: ICD-10-CM

## 2024-08-20 DIAGNOSIS — Z72.52 HIGH RISK HOMOSEXUAL BEHAVIOR: ICD-10-CM

## 2024-08-20 DIAGNOSIS — Z20.2 EXPOSURE TO STD: Primary | ICD-10-CM

## 2024-08-20 DIAGNOSIS — F41.1 GENERALIZED ANXIETY DISORDER: ICD-10-CM

## 2024-08-20 PROCEDURE — 1159F MED LIST DOCD IN RCRD: CPT | Performed by: INTERNAL MEDICINE

## 2024-08-20 PROCEDURE — 1160F RVW MEDS BY RX/DR IN RCRD: CPT | Performed by: INTERNAL MEDICINE

## 2024-08-20 PROCEDURE — 99214 OFFICE O/P EST MOD 30 MIN: CPT | Performed by: INTERNAL MEDICINE

## 2024-08-20 NOTE — ASSESSMENT & PLAN NOTE
Homosexual, exposure to gonorrhea using condoms, no objective abnormalities noted, plan routine STD screening including GC, emphasized need for barrier protection

## 2024-08-20 NOTE — PROGRESS NOTES
Venipuncture Blood Specimen Collection  Venipuncture performed in right arm by Kalyn Luna MA with good hemostasis. Patient tolerated the procedure well without complications.   08/20/24   Kalyn Luna MA

## 2024-08-20 NOTE — ASSESSMENT & PLAN NOTE
Sustained frontal contusion type injury to the left knee on 7/28/2024, ER evaluation 7/30/2024 with x-ray revealing some soft tissue anterior swelling but otherwise no abnormalities.  Exam today most consistent with soft tissue contusion injury more anteriorly with some mild medial discomfort.  No obvious evidence of any intra-articular derangement.  Plan conservative treatment initially with ibuprofen, along with the suggestion of wearing a left knee sleeve for support.  If symptoms not continuing to resolve over the next several weeks, advise accordingly for consideration of orthopedic referral.
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
## 2024-08-20 NOTE — ASSESSMENT & PLAN NOTE
Indicates he has reduced his consumption though not completely stopped.  Encouraged to pursue complete abstinence for health risk reduction

## 2024-08-20 NOTE — ASSESSMENT & PLAN NOTE
Continues satisfactory control of his anxiety along with his dysthymia taking sertraline 50 mg daily.  Continue current regimen.

## 2024-08-20 NOTE — ASSESSMENT & PLAN NOTE
Previously had high risk STD exposure with sexual activity not using barrier section.  Negative STD screens most recently in 3/2024, will repeat screen today reporting having had exposure to gonorrhea with a partner 2 weeks ago albeit wearing a condom.

## 2024-08-21 LAB
HBV E AG SERPL QL IA: NEGATIVE
HCV IGG SERPL QL IA: NON REACTIVE
HIV 1+2 AB+HIV1 P24 AG SERPL QL IA: NON REACTIVE
RPR SER QL: NON REACTIVE

## 2024-08-22 ENCOUNTER — TELEPHONE (OUTPATIENT)
Dept: FAMILY MEDICINE CLINIC | Facility: CLINIC | Age: 24
End: 2024-08-22
Payer: COMMERCIAL

## 2024-08-22 LAB
C TRACH RRNA SPEC QL NAA+PROBE: NEGATIVE
N GONORRHOEA RRNA SPEC QL NAA+PROBE: NEGATIVE

## 2024-08-22 NOTE — TELEPHONE ENCOUNTER
----- Message from Luke Graham sent at 8/22/2024  8:25 AM EDT -----  Please advise patient that his recent lab testing was all satisfactory including negative testing for gonorrhea, chlamydia, syphilis, HIV, hepatitis B and hepatitis C.  Safe sex practices encouraged including use of barrier protection.    I have spoke with him regarding his lab results. TF

## 2025-02-24 ENCOUNTER — OFFICE VISIT (OUTPATIENT)
Dept: FAMILY MEDICINE CLINIC | Facility: CLINIC | Age: 25
End: 2025-02-24
Payer: COMMERCIAL

## 2025-02-24 VITALS
OXYGEN SATURATION: 99 % | SYSTOLIC BLOOD PRESSURE: 112 MMHG | HEART RATE: 69 BPM | BODY MASS INDEX: 23.34 KG/M2 | TEMPERATURE: 98.1 F | DIASTOLIC BLOOD PRESSURE: 72 MMHG | HEIGHT: 69 IN | WEIGHT: 157.6 LBS

## 2025-02-24 DIAGNOSIS — F41.1 GENERALIZED ANXIETY DISORDER: ICD-10-CM

## 2025-02-24 DIAGNOSIS — Z11.3 SCREEN FOR STD (SEXUALLY TRANSMITTED DISEASE): Primary | ICD-10-CM

## 2025-02-24 DIAGNOSIS — R21 RASH: ICD-10-CM

## 2025-02-24 DIAGNOSIS — F34.1 DYSTHYMIA: ICD-10-CM

## 2025-02-24 PROCEDURE — 99214 OFFICE O/P EST MOD 30 MIN: CPT | Performed by: INTERNAL MEDICINE

## 2025-02-24 PROCEDURE — 36415 COLL VENOUS BLD VENIPUNCTURE: CPT | Performed by: INTERNAL MEDICINE

## 2025-02-24 NOTE — PROGRESS NOTES
Follow Up Office Visit      Date: 2025   Patient Name: Yoandy Merida  : 2000   MRN: [id number]     Chief Complaint:    Chief Complaint   Patient presents with    STD screen     Little red spots on upper body       History of Present Illness: Yoandy Merida is a 24 y.o. male who is here today for presenting primarily for purposes of repeating STD screening, noting he is a homosexual who uses condoms with all sexual encounters, has a single partner, and indicates he does not have anal sex.  Does not have any obvious signs of an STD, just wants to be careful.  At this time does not feel he needs PrEP he also has a history of ERLIN with dysthymia which he relates is controlling his symptoms well taking sertraline 50 mg daily.  He does note having developed several red skin lesions over the last week, 2 or 3 of which have been on his right arm and another at the base of his left neck.  The lesions are asymptomatic and one of them seems to be resolving.  No new history of topical or ingested exposures.  Does not feel ill with no fevers chills or sweats.    Subjective      Review of Systems:   Review of Systems    I have reviewed the patients family history, social history, past medical history, past surgical history and have updated it as appropriate.     Medications:     Current Outpatient Medications:     cetirizine (zyrTEC) 10 MG tablet, Take 1 tablet by mouth Daily., Disp: 30 tablet, Rfl: 5    montelukast (SINGULAIR) 10 MG tablet, TAKE 1 TABLET BY MOUTH EVERY NIGHT FOR PREVENTION OF ALLERGIES, Disp: 30 tablet, Rfl: 5    sertraline (ZOLOFT) 50 MG tablet, TAKE 1 TABLET BY MOUTH EVERY DAY, Disp: 30 tablet, Rfl: 11    Allergies:   Allergies   Allergen Reactions    Penicillins Hives    Amoxil [Amoxicillin] Rash       Objective     Physical Exam: Please see above  Vital Signs:   Vitals:    25 1314   BP: 112/72   BP Location: Left arm   Patient Position: Sitting   Cuff Size: Adult   Pulse: 69   Temp: 98.1 °F  "(36.7 °C)   TempSrc: Temporal   SpO2: 99%   Weight: 71.5 kg (157 lb 9.6 oz)   Height: 174 cm (68.5\")   PainSc: 0-No pain     Body mass index is 23.61 kg/m².  BMI is within normal parameters. No other follow-up for BMI required.       Physical Exam  Constitutional:       General: He is not in acute distress.     Appearance: Normal appearance. He is normal weight. He is not ill-appearing.   HENT:      Mouth/Throat:      Mouth: Mucous membranes are moist.      Pharynx: Oropharynx is clear. No oropharyngeal exudate or posterior oropharyngeal erythema.   Cardiovascular:      Rate and Rhythm: Normal rate and regular rhythm.      Heart sounds: Normal heart sounds. No murmur heard.     No friction rub. No gallop.   Pulmonary:      Effort: Pulmonary effort is normal. No respiratory distress.      Breath sounds: Normal breath sounds.   Musculoskeletal:      Cervical back: No rigidity or tenderness.   Lymphadenopathy:      Cervical: No cervical adenopathy.   Skin:     Findings: Erythema and rash present.      Comments: 2 separate approximate 5-6 mm diameter erythematous round slightly raised blanching skin lesions on his proximal arm, and a single similar-appearing lesion at the base of his left neck.  Very nonspecific in nature.  No rash noted elsewhere.  No intraoral lesions   Neurological:      General: No focal deficit present.      Mental Status: He is alert.   Psychiatric:         Mood and Affect: Mood normal.         Procedures    Results:   Labs:   Hemoglobin A1C   Date Value Ref Range Status   03/18/2024 5.2 4.8 - 5.6 % Final     Comment:              Prediabetes: 5.7 - 6.4           Diabetes: >6.4           Glycemic control for adults with diabetes: <7.0       TSH   Date Value Ref Range Status   03/18/2024 2.590 0.450 - 4.500 uIU/mL Final        POCT Results (if applicable):   Results for orders placed or performed in visit on 08/20/24   RPR Qualitative with Reflex to Quant    Collection Time: 08/20/24  3:04 PM    " Specimen: Arm, Right; Blood    Blood  Release to paul   Result Value Ref Range    RPR Non Reactive Non Reactive   Hepatitis B E Antigen    Collection Time: 08/20/24  3:04 PM    Specimen: Arm, Right; Blood    Blood  Release to paul   Result Value Ref Range    Hep B E Ag Negative Negative   Hepatitis C Antibody    Collection Time: 08/20/24  3:04 PM    Specimen: Arm, Right; Blood    Blood  Release to paul   Result Value Ref Range    Hep C Virus Ab Non Reactive Non Reactive   HIV-1 / O / 2 Ag / Antibody    Collection Time: 08/20/24  3:04 PM    Specimen: Arm, Right; Blood    Blood  Release to paul   Result Value Ref Range    HIV Screen 4th Gen w/RFX (Reference) Non Reactive Non Reactive   Chlamydia trachomatis, Neisseria gonorrhoeae, PCR - Urine, Urine, Clean Catch    Collection Time: 08/20/24  3:05 PM    Specimen: Urine, Clean Catch    Urine  Release to paul   Result Value Ref Range    Chlamydia trachomatis, [place] Negative Negative    Neisseria gonorrhoeae, [place] Negative Negative       Assessment / Plan      Assessment/Plan:   Diagnoses and all orders for this visit:    1. Screen for STD (sexually transmitted disease) (Primary)  Assessment & Plan:  Patient is homosexual MSM, single partner reported, currently reportedly using condoms though apparently not consistently in the past, requesting routine STD screen which order as noted.  I did discuss concept of PrEP which he declines at this time.  Discussed sex practices including barrier protection with each encounter.    Orders:  -     HIV-1 / O / 2 Ag / Antibody; Future  -     Cancel: VIRAL HEPATITIS HBV, HCV; Future  -     Cancel: RPR Qualitative with Reflex to Quant; Future  -     Cancel: HSV 1 & 2 - Specific Antibody, IgG; Future  -     Chlamydia trachomatis, Neisseria gonorrhoeae, PCR - Urine, Urine, Clean Catch; Future  -     HSV 1 & 2 - Specific Antibody, IgG; Future  -     RPR Qualitative with Reflex to Quant; Future  -     VIRAL HEPATITIS HBV, HCV; Future  -      VIRAL HEPATITIS HBV, HCV  -     RPR Qualitative with Reflex to Quant  -     HSV 1 & 2 - Specific Antibody, IgG  -     Chlamydia trachomatis, Neisseria gonorrhoeae, PCR - Urine, Urine, Clean Catch  -     HIV-1 / O / 2 Ag / Antibody    2. Rash  Assessment & Plan:  Nonspecific erythematous blanching rash, currently has 3 lesions, 2 of which are in the proximal arm and 1 on the left anterior base of the neck.  Nonspecific in nature, previous lesion spontaneously resolved.  Does not have any history or physical stigmata that would be consistent with an obvious infectious process, and no clear association with any STD at this stage.  Recommended observation at this stage anticipating ongoing clinical resolution, checking standard STD screening testing as noted above.      3. Generalized anxiety disorder  Assessment & Plan:  Relates satisfactory control of anxiety and dysthymia taking sertraline 50 mg daily.  Continue current regimen.      4. Dysthymia  Assessment & Plan:  Relates satisfactory control of anxiety and dysthymia taking sertraline 50 mg daily.  Continue current regimen.          Follow Up:   Return in about 3 months (around 5/24/2025) for Annual physical.      At [facility], we believe that sharing information builds trust and better relationships. You are receiving this note because you recently visited [facility]. It is possible you will see health information before a provider has talked with you about it. This kind of information can be easy to misunderstand. To help you fully understand what it means for your health, we urge you to discuss this note with your provider.    Luke Graham MD  [facility]

## 2025-02-24 NOTE — PROGRESS NOTES
..  Venipuncture Blood Specimen Collection  Venipuncture performed in left arm by Karen José with good hemostasis. Patient tolerated the procedure well without complications.   02/24/25   Karen José

## 2025-02-25 LAB
HBV CORE AB SERPL QL IA: NEGATIVE
HBV SURFACE AB SER QL: NON REACTIVE
HBV SURFACE AG SERPL QL IA: NEGATIVE
HCV AB SERPL QL IA: NORMAL
HCV IGG SERPL QL IA: NON REACTIVE
HIV 1+2 AB+HIV1 P24 AG SERPL QL IA: NON REACTIVE
HSV1 IGG SERPL QL IA: REACTIVE
HSV2 IGG SERPL QL IA: NON REACTIVE
RPR SER QL: NON REACTIVE

## 2025-02-25 NOTE — ASSESSMENT & PLAN NOTE
Patient is homosexual MSM, single partner reported, currently reportedly using condoms though apparently not consistently in the past, requesting routine STD screen which order as noted.  I did discuss concept of PrEP which he declines at this time.  Discussed sex practices including barrier protection with each encounter.

## 2025-02-25 NOTE — ASSESSMENT & PLAN NOTE
Relates satisfactory control of anxiety and dysthymia taking sertraline 50 mg daily.  Continue current regimen.

## 2025-02-25 NOTE — ASSESSMENT & PLAN NOTE
Nonspecific erythematous blanching rash, currently has 3 lesions, 2 of which are in the proximal arm and 1 on the left anterior base of the neck.  Nonspecific in nature, previous lesion spontaneously resolved.  Does not have any history or physical stigmata that would be consistent with an obvious infectious process, and no clear association with any STD at this stage.  Recommended observation at this stage anticipating ongoing clinical resolution, checking standard STD screening testing as noted above.

## 2025-02-26 ENCOUNTER — TELEPHONE (OUTPATIENT)
Dept: FAMILY MEDICINE CLINIC | Facility: CLINIC | Age: 25
End: 2025-02-26
Payer: COMMERCIAL

## 2025-02-26 LAB
C TRACH RRNA SPEC QL NAA+PROBE: NEGATIVE
N GONORRHOEA RRNA SPEC QL NAA+PROBE: NEGATIVE

## 2025-02-26 NOTE — TELEPHONE ENCOUNTER
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  Remainder of lab testing normal including gonorrhea and chlamydia, viral hepatitis profile for type B and C, syphilis testing and HIV screen.  Patient encouraged to pursue sex practices    I have left him a vm regarding his lab results. I have asked that he call if he has any questions. TF

## 2025-03-24 DIAGNOSIS — J30.1 SEASONAL ALLERGIC RHINITIS DUE TO POLLEN: ICD-10-CM

## 2025-03-24 RX ORDER — MONTELUKAST SODIUM 10 MG/1
10 TABLET ORAL NIGHTLY
Qty: 30 TABLET | Refills: 4 | Status: SHIPPED | OUTPATIENT
Start: 2025-03-24